# Patient Record
Sex: FEMALE | Race: WHITE | NOT HISPANIC OR LATINO | Employment: UNEMPLOYED | URBAN - METROPOLITAN AREA
[De-identification: names, ages, dates, MRNs, and addresses within clinical notes are randomized per-mention and may not be internally consistent; named-entity substitution may affect disease eponyms.]

---

## 2023-09-11 ENCOUNTER — HOSPITAL ENCOUNTER (EMERGENCY)
Facility: HOSPITAL | Age: 38
Discharge: HOME | End: 2023-09-11
Attending: EMERGENCY MEDICINE | Admitting: EMERGENCY MEDICINE
Payer: COMMERCIAL

## 2023-09-11 VITALS
RESPIRATION RATE: 18 BRPM | OXYGEN SATURATION: 100 % | SYSTOLIC BLOOD PRESSURE: 111 MMHG | HEIGHT: 66 IN | HEART RATE: 80 BPM | WEIGHT: 125 LBS | BODY MASS INDEX: 20.09 KG/M2 | TEMPERATURE: 98.6 F | DIASTOLIC BLOOD PRESSURE: 68 MMHG

## 2023-09-11 DIAGNOSIS — L02.415 CUTANEOUS ABSCESS OF RIGHT LOWER EXTREMITY: Primary | ICD-10-CM

## 2023-09-11 PROCEDURE — 87186 SC STD MICRODIL/AGAR DIL: CPT | Performed by: EMERGENCY MEDICINE

## 2023-09-11 PROCEDURE — 87147 CULTURE TYPE IMMUNOLOGIC: CPT | Performed by: EMERGENCY MEDICINE

## 2023-09-11 PROCEDURE — 87205 SMEAR GRAM STAIN: CPT | Performed by: EMERGENCY MEDICINE

## 2023-09-11 PROCEDURE — 99283 EMERGENCY DEPT VISIT LOW MDM: CPT

## 2023-09-11 PROCEDURE — 87070 CULTURE OTHR SPECIMN AEROBIC: CPT | Performed by: EMERGENCY MEDICINE

## 2023-09-11 RX ORDER — DOXYCYCLINE 100 MG/1
100 CAPSULE ORAL 2 TIMES DAILY
Qty: 14 CAPSULE | Refills: 0 | Status: SHIPPED | OUTPATIENT
Start: 2023-09-11

## 2023-09-11 RX ORDER — DOXYCYCLINE 100 MG/1
100 CAPSULE ORAL ONCE
Status: COMPLETED | OUTPATIENT
Start: 2023-09-11 | End: 2023-09-11

## 2023-09-11 RX ORDER — HYDROCODONE BITARTRATE AND ACETAMINOPHEN 7.5; 325 MG/1; MG/1
1 TABLET ORAL ONCE
Status: COMPLETED | OUTPATIENT
Start: 2023-09-11 | End: 2023-09-11

## 2023-09-11 RX ADMIN — DOXYCYCLINE 100 MG: 100 CAPSULE ORAL at 19:29

## 2023-09-11 RX ADMIN — HYDROCODONE BITARTRATE AND ACETAMINOPHEN 1 TABLET: 7.5; 325 TABLET ORAL at 18:30

## 2023-09-11 NOTE — DISCHARGE INSTRUCTIONS
Wound check with primary care in 2 to 3 days for recheck and culture results.  Continue doxycycline.  Warm compress, warm water rinses.  Elevate the extremity.  Packing removal in 2 days.  Return for increased pain, fever, persistent vomiting or any other concerns.

## 2023-09-11 NOTE — ED PROVIDER NOTES
"Subjective   History of Present Illness  38-year-old female noted to painful bump on the right side of her lower thigh over the last 2 to 3 days.  She denies fevers or chills.  States started a small pimple.  She reports no witnessed insect bites but was concerned about possible spider bite.  She reports no systemic symptoms.  Review of Systems    No past medical history on file.    Allergies   Allergen Reactions    Amoxicillin Unknown - High Severity    Penicillins Unknown - High Severity    Sulfa Antibiotics Unknown - High Severity       No past surgical history on file.    No family history on file.    Social History     Socioeconomic History    Marital status:        Prior to Admission medications    Medication Sig Start Date End Date Taking? Authorizing Provider   doxycycline (MONODOX) 100 MG capsule Take 1 capsule by mouth 2 (Two) Times a Day. 9/11/23   Nuno Alvarez MD     /68   Pulse 80   Temp 98.6 °F (37 °C)   Resp 18   Ht 167.6 cm (66\")   Wt 56.7 kg (125 lb)   SpO2 100%   BMI 20.18 kg/m²       Objective   Physical Exam  General: Well-appearing, no acute distress  Psych: Oriented, pleasant affect  Respirations: Clear, nonlabored respirations  Skin: Raised superficial area of erythema and fluctuance on the lateral aspect of the right lower thigh is about 2 cm in diameter, there is no surrounding erythema it is well localized to the raised lesion.  Is tender to palpation there is no deep structure involvement  Incision & Drainage    Date/Time: 9/11/2023 7:18 PM  Performed by: Nuno Alvarez MD  Authorized by: Nuno Alvarez MD     Consent:     Consent obtained:  Verbal    Consent given by:  Patient    Risks, benefits, and alternatives were discussed: yes      Risks discussed:  Bleeding, incomplete drainage, infection and pain    Alternatives discussed:  No treatment  Universal protocol:     Procedure explained and questions answered to patient or proxy's satisfaction: yes     "  Patient identity confirmed:  Verbally with patient  Location:     Type:  Abscess    Location:  Lower extremity    Lower extremity location:  Leg    Leg location:  R upper leg  Pre-procedure details:     Skin preparation:  Povidone-iodine  Sedation:     Sedation type:  None  Anesthesia:     Anesthesia method:  Local infiltration    Local anesthetic:  Lidocaine 1% WITH epi  Procedure type:     Complexity:  Complex  Procedure details:     Incision types:  Single straight    Incision depth:  Dermal    Wound management:  Probed and deloculated and irrigated with saline    Drainage:  Bloody    Drainage amount:  Scant    Wound treatment:  Drain placed    Packing materials:  1/4 in iodoform gauze  Post-procedure details:     Procedure completion:  Tolerated           ED Course  ED Course as of 09/11/23 1917   Mon Sep 11, 2023   1916 She reports additional allergy to clindamycin [SH]      ED Course User Index  [SH] Nuno Alvarez MD                                           Medical Decision Making  We discussed possibility of staph, MRSA, as well as spider bite including brown recluse bite.  Today's exam was consistent with a superficial cutaneous abscess and it was incised and drained.  She was advised of findings and discharged good condition she has multiple medical allergies and was prescribed doxycycline.  She was given Norco in emergency room for discomfort.  She is given warning signs for return and referred for primary care follow-up.    Problems Addressed:  Cutaneous abscess of right lower extremity: complicated acute illness or injury    Amount and/or Complexity of Data Reviewed  Labs: ordered.    Risk  Prescription drug management.        Final diagnoses:   Cutaneous abscess of right lower extremity       ED Disposition  ED Disposition       ED Disposition   Discharge    Condition   Stable    Comment   --               PATIENT CONNECTION - [facility] [address]  [phone]  Schedule an appointment  as soon as possible for a visit in 3 days           Medication List        New Prescriptions      doxycycline 100 MG capsule  Commonly known as: MONODOX  Take 1 capsule by mouth 2 (Two) Times a Day.               Where to Get Your Medications        These medications were sent to [facility]/pharmacy #[id number] - [place], [place] - [address] - [phone] [facility] [phone]   [address] [place] [address]      Phone: [phone]   doxycycline 100 MG capsule            Nuno Alvarez MD  09/11/23 1926

## 2023-09-13 ENCOUNTER — HOSPITAL ENCOUNTER (EMERGENCY)
Facility: HOSPITAL | Age: 38
Discharge: HOME | End: 2023-09-14
Attending: EMERGENCY MEDICINE
Payer: COMMERCIAL

## 2023-09-13 DIAGNOSIS — L02.415 CUTANEOUS ABSCESS OF RIGHT LOWER EXTREMITY: Primary | ICD-10-CM

## 2023-09-13 PROCEDURE — 99282 EMERGENCY DEPT VISIT SF MDM: CPT

## 2023-09-14 VITALS
SYSTOLIC BLOOD PRESSURE: 113 MMHG | BODY MASS INDEX: 21.89 KG/M2 | TEMPERATURE: 97.9 F | RESPIRATION RATE: 16 BRPM | HEART RATE: 91 BPM | OXYGEN SATURATION: 98 % | DIASTOLIC BLOOD PRESSURE: 62 MMHG | HEIGHT: 65 IN | WEIGHT: 131.39 LBS

## 2023-09-14 LAB
BACTERIA SPEC AEROBE CULT: ABNORMAL
GRAM STN SPEC: ABNORMAL
GRAM STN SPEC: ABNORMAL

## 2023-09-14 NOTE — DISCHARGE INSTRUCTIONS
Warm compresses, wash the area twice daily with some warm soapy water.  Continue doxycycline.  Return for increased pain, fever, vomiting or any other concerns

## 2023-09-14 NOTE — PROGRESS NOTES
Patient wound culture resulted with MRSA. Susceptible to Tetracyclines. Patient was given Rx for doxycycline. Therapy is appropriate coverage. No further follow-up required..    Microbiology Results (last 10 days)       Procedure Component Value - Date/Time    Wound Culture - Aspirate, Leg, Right [[id number]]  (Abnormal)  (Susceptibility) Collected: 09/11/23 1914    Lab Status: Final result Specimen: Aspirate from Leg, Right Updated: 09/14/23 0624     Wound Culture Light growth (2+) Staphylococcus aureus, MRSA     Comment:   Methicillin resistant Staphylococcus aureus, Patient may be an isolation risk.        Gram Stain Moderate (3+) WBCs seen      No organisms seen    Susceptibility        Staphylococcus aureus, MRSA      TAJ      Clindamycin Resistant      Erythromycin Resistant      Inducible Clindamycin Resistance Negative      Oxacillin Resistant      Rifampin Susceptible      Tetracycline Susceptible      Trimethoprim + Sulfamethoxazole Resistant      Vancomycin Susceptible                                   Grace Huang, Sammie  9/14/2023 11:22 EDT

## 2023-09-14 NOTE — ED PROVIDER NOTES
"Subjective   History of Present Illness  38-year-old female returns today for wound check.  States she is not yet started the doxycycline that was prescribed a couple of days ago following incision and drainage of a cutaneous abscess on her right thigh.  She reports no fevers chills or vomiting.  States she has now obtained some money to  the antibiotic in the morning.  She states the packing came out after about a day and a half following the incision and drainage.  She otherwise reports no active drainage or any other complaints.  Review of Systems    No past medical history on file.    Allergies   Allergen Reactions    Amoxicillin Unknown - High Severity    Penicillins Unknown - High Severity    Sulfa Antibiotics Unknown - High Severity       No past surgical history on file.    No family history on file.    Social History     Socioeconomic History    Marital status:        Prior to Admission medications    Medication Sig Start Date End Date Taking? Authorizing Provider   doxycycline (MONODOX) 100 MG capsule Take 1 capsule by mouth 2 (Two) Times a Day. 9/11/23   Nuno Alvarez MD     /70   Pulse 92   Temp 97.3 °F (36.3 °C) (Oral)   Resp 18   Ht 165.1 cm (65\")   Wt 59.6 kg (131 lb 6.3 oz)   SpO2 98%   BMI 21.87 kg/m²       Objective   Physical Exam  General: Well-appearing, no acute distress  Psych: Oriented, pleasant affect  Respirations: Clear, nonlabored respirations  Skin: Right thigh on the lateral aspect the area appears to be healing there is no significant surrounding erythema or fluctuance or drainage.  There is a raised cutaneous abscess has been incised and drained, no surrounding tissue involvement  Procedures           ED Course                                           Medical Decision Making  Patient advised the findings and advised maintain supportive care measures.  She is encouraged to start the doxycycline.  She was given warning signs for return and discharged in " good condition.    Problems Addressed:  Cutaneous abscess of right lower extremity: acute illness or injury    Amount and/or Complexity of Data Reviewed  External Data Reviewed: labs.     Details: Reviewed the culture result which is positive for MRSA, sensitivities pending        Final diagnoses:   Cutaneous abscess of right lower extremity       ED Disposition  ED Disposition       ED Disposition   Discharge    Condition   Stable    Comment   --               PATIENT CONNECTION - [facility] [address]  [phone]  Schedule an appointment as soon as possible for a visit in 1 week           Medication List      No changes were made to your prescriptions during this visit.            Nuno Alvarez MD  09/13/23 2794

## 2023-11-04 ENCOUNTER — ANESTHESIA EVENT (OUTPATIENT)
Dept: PERIOP | Facility: HOSPITAL | Age: 38
End: 2023-11-04
Payer: COMMERCIAL

## 2023-11-04 ENCOUNTER — HOSPITAL ENCOUNTER (OUTPATIENT)
Facility: HOSPITAL | Age: 38
Discharge: HOME | End: 2023-11-06
Attending: EMERGENCY MEDICINE | Admitting: SURGERY
Payer: COMMERCIAL

## 2023-11-04 ENCOUNTER — APPOINTMENT (OUTPATIENT)
Dept: GENERAL RADIOLOGY | Facility: HOSPITAL | Age: 38
End: 2023-11-04
Payer: COMMERCIAL

## 2023-11-04 ENCOUNTER — APPOINTMENT (OUTPATIENT)
Dept: ULTRASOUND IMAGING | Facility: HOSPITAL | Age: 38
End: 2023-11-04
Payer: COMMERCIAL

## 2023-11-04 ENCOUNTER — ANESTHESIA (OUTPATIENT)
Dept: PERIOP | Facility: HOSPITAL | Age: 38
End: 2023-11-04
Payer: COMMERCIAL

## 2023-11-04 DIAGNOSIS — D72.829 LEUKOCYTOSIS, UNSPECIFIED TYPE: ICD-10-CM

## 2023-11-04 DIAGNOSIS — L02.91 ABSCESS: ICD-10-CM

## 2023-11-04 DIAGNOSIS — M79.602 LEFT ARM PAIN: ICD-10-CM

## 2023-11-04 DIAGNOSIS — L02.414 ABSCESS OF LEFT UPPER EXTREMITY: Primary | ICD-10-CM

## 2023-11-04 DIAGNOSIS — F19.10 IV DRUG ABUSE: ICD-10-CM

## 2023-11-04 PROBLEM — F15.10 METHAMPHETAMINE ABUSE: Status: ACTIVE | Noted: 2023-11-04

## 2023-11-04 PROBLEM — L03.114 CELLULITIS OF LEFT UPPER EXTREMITY: Status: ACTIVE | Noted: 2023-11-04

## 2023-11-04 PROBLEM — L02.419 ABSCESS OF ANTECUBITAL FOSSA: Status: ACTIVE | Noted: 2023-11-04

## 2023-11-04 PROBLEM — L03.114 CELLULITIS OF LEFT UPPER ARM: Status: ACTIVE | Noted: 2023-11-04

## 2023-11-04 PROBLEM — A49.02 MRSA (METHICILLIN RESISTANT STAPHYLOCOCCUS AUREUS): Status: ACTIVE | Noted: 2023-11-04

## 2023-11-04 LAB
AMPHET+METHAMPHET UR QL: POSITIVE
ANION GAP SERPL CALCULATED.3IONS-SCNC: 8 MMOL/L (ref 5–15)
BARBITURATES UR QL SCN: NEGATIVE
BASOPHILS # BLD AUTO: 0.05 10*3/MM3 (ref 0–0.2)
BASOPHILS NFR BLD AUTO: 0.3 % (ref 0–1.5)
BENZODIAZ UR QL SCN: NEGATIVE
BUN SERPL-MCNC: 17 MG/DL (ref 6–20)
BUN/CREAT SERPL: 21 (ref 7–25)
CALCIUM SPEC-SCNC: 8.8 MG/DL (ref 8.6–10.5)
CANNABINOIDS SERPL QL: NEGATIVE
CHLORIDE SERPL-SCNC: 102 MMOL/L (ref 98–107)
CK SERPL-CCNC: 255 U/L (ref 20–180)
CO2 SERPL-SCNC: 25 MMOL/L (ref 22–29)
COCAINE UR QL: NEGATIVE
CREAT SERPL-MCNC: 0.81 MG/DL (ref 0.57–1)
D-LACTATE SERPL-SCNC: 1 MMOL/L (ref 0.5–2)
DEPRECATED RDW RBC AUTO: 40.3 FL (ref 37–54)
EGFRCR SERPLBLD CKD-EPI 2021: 96 ML/MIN/1.73
EOSINOPHIL # BLD AUTO: 0.2 10*3/MM3 (ref 0–0.4)
EOSINOPHIL NFR BLD AUTO: 1.2 % (ref 0.3–6.2)
ERYTHROCYTE [DISTWIDTH] IN BLOOD BY AUTOMATED COUNT: 12.4 % (ref 12.3–15.4)
ETHANOL BLD-MCNC: <10 MG/DL (ref 0–10)
ETHANOL UR QL: <0.01 %
FENTANYL UR-MCNC: NEGATIVE NG/ML
GLUCOSE BLDC GLUCOMTR-MCNC: 132 MG/DL (ref 70–130)
GLUCOSE SERPL-MCNC: 117 MG/DL (ref 65–99)
HCG SERPL QL: NEGATIVE
HCT VFR BLD AUTO: 39 % (ref 34–46.6)
HGB BLD-MCNC: 13 G/DL (ref 12–15.9)
IMM GRANULOCYTES # BLD AUTO: 0.1 10*3/MM3 (ref 0–0.05)
IMM GRANULOCYTES NFR BLD AUTO: 0.6 % (ref 0–0.5)
LYMPHOCYTES # BLD AUTO: 1.95 10*3/MM3 (ref 0.7–3.1)
LYMPHOCYTES NFR BLD AUTO: 11.4 % (ref 19.6–45.3)
MCH RBC QN AUTO: 29.5 PG (ref 26.6–33)
MCHC RBC AUTO-ENTMCNC: 33.3 G/DL (ref 31.5–35.7)
MCV RBC AUTO: 88.6 FL (ref 79–97)
METHADONE UR QL SCN: NEGATIVE
MONOCYTES # BLD AUTO: 0.92 10*3/MM3 (ref 0.1–0.9)
MONOCYTES NFR BLD AUTO: 5.4 % (ref 5–12)
NEUTROPHILS NFR BLD AUTO: 13.84 10*3/MM3 (ref 1.7–7)
NEUTROPHILS NFR BLD AUTO: 81.1 % (ref 42.7–76)
NRBC BLD AUTO-RTO: 0 /100 WBC (ref 0–0.2)
OPIATES UR QL: NEGATIVE
OXYCODONE UR QL SCN: NEGATIVE
PLATELET # BLD AUTO: 275 10*3/MM3 (ref 140–450)
PMV BLD AUTO: 10.7 FL (ref 6–12)
POTASSIUM SERPL-SCNC: 3.7 MMOL/L (ref 3.5–5.2)
PROCALCITONIN SERPL-MCNC: 0.06 NG/ML (ref 0–0.25)
RBC # BLD AUTO: 4.4 10*6/MM3 (ref 3.77–5.28)
SODIUM SERPL-SCNC: 135 MMOL/L (ref 136–145)
WBC NRBC COR # BLD: 17.06 10*3/MM3 (ref 3.4–10.8)

## 2023-11-04 PROCEDURE — 25810000003 LACTATED RINGERS PER 1000 ML: Performed by: ANESTHESIOLOGY

## 2023-11-04 PROCEDURE — 85025 COMPLETE CBC W/AUTO DIFF WBC: CPT | Performed by: PHYSICIAN ASSISTANT

## 2023-11-04 PROCEDURE — G0378 HOSPITAL OBSERVATION PER HR: HCPCS

## 2023-11-04 PROCEDURE — 87040 BLOOD CULTURE FOR BACTERIA: CPT | Performed by: NURSE PRACTITIONER

## 2023-11-04 PROCEDURE — 80307 DRUG TEST PRSMV CHEM ANLYZR: CPT | Performed by: PHYSICIAN ASSISTANT

## 2023-11-04 PROCEDURE — 84145 PROCALCITONIN (PCT): CPT | Performed by: NURSE PRACTITIONER

## 2023-11-04 PROCEDURE — 25010000002 FENTANYL CITRATE (PF) 50 MCG/ML SOLUTION: Performed by: ANESTHESIOLOGY

## 2023-11-04 PROCEDURE — 99203 OFFICE O/P NEW LOW 30 MIN: CPT | Performed by: SURGERY

## 2023-11-04 PROCEDURE — 87075 CULTR BACTERIA EXCEPT BLOOD: CPT | Performed by: SURGERY

## 2023-11-04 PROCEDURE — 76882 US LMTD JT/FCL EVL NVASC XTR: CPT

## 2023-11-04 PROCEDURE — 82550 ASSAY OF CK (CPK): CPT | Performed by: PHYSICIAN ASSISTANT

## 2023-11-04 PROCEDURE — 36415 COLL VENOUS BLD VENIPUNCTURE: CPT

## 2023-11-04 PROCEDURE — 96365 THER/PROPH/DIAG IV INF INIT: CPT

## 2023-11-04 PROCEDURE — 25010000002 MIDAZOLAM PER 1 MG: Performed by: ANESTHESIOLOGY

## 2023-11-04 PROCEDURE — 99284 EMERGENCY DEPT VISIT MOD MDM: CPT

## 2023-11-04 PROCEDURE — 25010000002 VANCOMYCIN 10 G RECONSTITUTED SOLUTION: Performed by: NURSE PRACTITIONER

## 2023-11-04 PROCEDURE — 25810000003 SODIUM CHLORIDE 0.9 % SOLUTION: Performed by: NURSE PRACTITIONER

## 2023-11-04 PROCEDURE — 10060 I&D ABSCESS SIMPLE/SINGLE: CPT | Performed by: SURGERY

## 2023-11-04 PROCEDURE — 82948 REAGENT STRIP/BLOOD GLUCOSE: CPT

## 2023-11-04 PROCEDURE — 25010000002 DEXAMETHASONE PER 1 MG: Performed by: ANESTHESIOLOGY

## 2023-11-04 PROCEDURE — 25010000002 ONDANSETRON PER 1 MG: Performed by: ANESTHESIOLOGY

## 2023-11-04 PROCEDURE — 80048 BASIC METABOLIC PNL TOTAL CA: CPT | Performed by: PHYSICIAN ASSISTANT

## 2023-11-04 PROCEDURE — 87070 CULTURE OTHR SPECIMN AEROBIC: CPT | Performed by: SURGERY

## 2023-11-04 PROCEDURE — 25010000002 PROPOFOL 500 MG/50ML EMULSION: Performed by: ANESTHESIOLOGY

## 2023-11-04 PROCEDURE — 25010000002 ROPIVACAINE PER 1 MG: Performed by: ANESTHESIOLOGY

## 2023-11-04 PROCEDURE — 84703 CHORIONIC GONADOTROPIN ASSAY: CPT | Performed by: PHYSICIAN ASSISTANT

## 2023-11-04 PROCEDURE — 82077 ASSAY SPEC XCP UR&BREATH IA: CPT | Performed by: PHYSICIAN ASSISTANT

## 2023-11-04 PROCEDURE — 25010000002 METHADONE PER 10 MG: Performed by: ANESTHESIOLOGY

## 2023-11-04 PROCEDURE — 83605 ASSAY OF LACTIC ACID: CPT | Performed by: NURSE PRACTITIONER

## 2023-11-04 PROCEDURE — 87205 SMEAR GRAM STAIN: CPT | Performed by: SURGERY

## 2023-11-04 PROCEDURE — 73070 X-RAY EXAM OF ELBOW: CPT

## 2023-11-04 RX ORDER — NALOXONE HCL 0.4 MG/ML
0.2 VIAL (ML) INJECTION AS NEEDED
Status: DISCONTINUED | OUTPATIENT
Start: 2023-11-04 | End: 2023-11-05

## 2023-11-04 RX ORDER — ACETAMINOPHEN 650 MG/1
650 SUPPOSITORY RECTAL EVERY 4 HOURS PRN
Status: DISCONTINUED | OUTPATIENT
Start: 2023-11-04 | End: 2023-11-06 | Stop reason: HOSPADM

## 2023-11-04 RX ORDER — ONDANSETRON 4 MG/1
4 TABLET, FILM COATED ORAL EVERY 6 HOURS PRN
Status: DISCONTINUED | OUTPATIENT
Start: 2023-11-04 | End: 2023-11-06 | Stop reason: HOSPADM

## 2023-11-04 RX ORDER — DROPERIDOL 2.5 MG/ML
0.62 INJECTION, SOLUTION INTRAMUSCULAR; INTRAVENOUS
Status: DISCONTINUED | OUTPATIENT
Start: 2023-11-04 | End: 2023-11-05

## 2023-11-04 RX ORDER — MIDAZOLAM HYDROCHLORIDE 1 MG/ML
INJECTION INTRAMUSCULAR; INTRAVENOUS
Status: COMPLETED | OUTPATIENT
Start: 2023-11-04 | End: 2023-11-04

## 2023-11-04 RX ORDER — SODIUM CHLORIDE, SODIUM LACTATE, POTASSIUM CHLORIDE, CALCIUM CHLORIDE 600; 310; 30; 20 MG/100ML; MG/100ML; MG/100ML; MG/100ML
9 INJECTION, SOLUTION INTRAVENOUS CONTINUOUS
Status: DISCONTINUED | OUTPATIENT
Start: 2023-11-04 | End: 2023-11-04

## 2023-11-04 RX ORDER — IPRATROPIUM BROMIDE AND ALBUTEROL SULFATE 2.5; .5 MG/3ML; MG/3ML
3 SOLUTION RESPIRATORY (INHALATION) ONCE AS NEEDED
Status: DISCONTINUED | OUTPATIENT
Start: 2023-11-04 | End: 2023-11-05 | Stop reason: SURG

## 2023-11-04 RX ORDER — SODIUM CHLORIDE 0.9 % (FLUSH) 0.9 %
10 SYRINGE (ML) INJECTION EVERY 12 HOURS SCHEDULED
Status: DISCONTINUED | OUTPATIENT
Start: 2023-11-04 | End: 2023-11-06 | Stop reason: HOSPADM

## 2023-11-04 RX ORDER — ACETAMINOPHEN 325 MG/1
650 TABLET ORAL EVERY 4 HOURS PRN
Status: DISCONTINUED | OUTPATIENT
Start: 2023-11-04 | End: 2023-11-06 | Stop reason: HOSPADM

## 2023-11-04 RX ORDER — HYDROMORPHONE HYDROCHLORIDE 1 MG/ML
0.5 INJECTION, SOLUTION INTRAMUSCULAR; INTRAVENOUS; SUBCUTANEOUS
Status: DISCONTINUED | OUTPATIENT
Start: 2023-11-04 | End: 2023-11-05 | Stop reason: SURG

## 2023-11-04 RX ORDER — VANCOMYCIN HYDROCHLORIDE 1 G/200ML
1000 INJECTION, SOLUTION INTRAVENOUS EVERY 12 HOURS
Status: DISCONTINUED | OUTPATIENT
Start: 2023-11-04 | End: 2023-11-06 | Stop reason: HOSPADM

## 2023-11-04 RX ORDER — HYDROCODONE BITARTRATE AND ACETAMINOPHEN 5; 325 MG/1; MG/1
1 TABLET ORAL ONCE AS NEEDED
Status: DISCONTINUED | OUTPATIENT
Start: 2023-11-04 | End: 2023-11-05 | Stop reason: SURG

## 2023-11-04 RX ORDER — HYDROCODONE BITARTRATE AND ACETAMINOPHEN 5; 325 MG/1; MG/1
1 TABLET ORAL EVERY 6 HOURS PRN
Status: DISCONTINUED | OUTPATIENT
Start: 2023-11-04 | End: 2023-11-06

## 2023-11-04 RX ORDER — LABETALOL HYDROCHLORIDE 5 MG/ML
5 INJECTION, SOLUTION INTRAVENOUS
Status: DISCONTINUED | OUTPATIENT
Start: 2023-11-04 | End: 2023-11-05 | Stop reason: SURG

## 2023-11-04 RX ORDER — HYDRALAZINE HYDROCHLORIDE 20 MG/ML
5 INJECTION INTRAMUSCULAR; INTRAVENOUS
Status: DISCONTINUED | OUTPATIENT
Start: 2023-11-04 | End: 2023-11-05 | Stop reason: SURG

## 2023-11-04 RX ORDER — SODIUM CHLORIDE 0.9 % (FLUSH) 0.9 %
3-10 SYRINGE (ML) INJECTION AS NEEDED
Status: DISCONTINUED | OUTPATIENT
Start: 2023-11-04 | End: 2023-11-04 | Stop reason: HOSPADM

## 2023-11-04 RX ORDER — PROMETHAZINE HYDROCHLORIDE 25 MG/1
25 TABLET ORAL ONCE AS NEEDED
Status: DISCONTINUED | OUTPATIENT
Start: 2023-11-04 | End: 2023-11-05

## 2023-11-04 RX ORDER — ACETAMINOPHEN 160 MG/5ML
650 SOLUTION ORAL EVERY 4 HOURS PRN
Status: DISCONTINUED | OUTPATIENT
Start: 2023-11-04 | End: 2023-11-06 | Stop reason: HOSPADM

## 2023-11-04 RX ORDER — ONDANSETRON 2 MG/ML
INJECTION INTRAMUSCULAR; INTRAVENOUS AS NEEDED
Status: DISCONTINUED | OUTPATIENT
Start: 2023-11-04 | End: 2023-11-04 | Stop reason: SURG

## 2023-11-04 RX ORDER — OXYCODONE AND ACETAMINOPHEN 7.5; 325 MG/1; MG/1
1 TABLET ORAL EVERY 4 HOURS PRN
Status: DISCONTINUED | OUTPATIENT
Start: 2023-11-04 | End: 2023-11-06

## 2023-11-04 RX ORDER — PROPOFOL 10 MG/ML
INJECTION, EMULSION INTRAVENOUS AS NEEDED
Status: DISCONTINUED | OUTPATIENT
Start: 2023-11-04 | End: 2023-11-04 | Stop reason: SURG

## 2023-11-04 RX ORDER — MIDAZOLAM HYDROCHLORIDE 1 MG/ML
1 INJECTION INTRAMUSCULAR; INTRAVENOUS
Status: DISCONTINUED | OUTPATIENT
Start: 2023-11-04 | End: 2023-11-04 | Stop reason: HOSPADM

## 2023-11-04 RX ORDER — FENTANYL CITRATE 50 UG/ML
50 INJECTION, SOLUTION INTRAMUSCULAR; INTRAVENOUS ONCE AS NEEDED
Status: COMPLETED | OUTPATIENT
Start: 2023-11-04 | End: 2023-11-04

## 2023-11-04 RX ORDER — LIDOCAINE HYDROCHLORIDE 10 MG/ML
0.5 INJECTION, SOLUTION INFILTRATION; PERINEURAL ONCE AS NEEDED
Status: DISCONTINUED | OUTPATIENT
Start: 2023-11-04 | End: 2023-11-04 | Stop reason: HOSPADM

## 2023-11-04 RX ORDER — ROPIVACAINE HYDROCHLORIDE 5 MG/ML
INJECTION, SOLUTION EPIDURAL; INFILTRATION; PERINEURAL
Status: COMPLETED | OUTPATIENT
Start: 2023-11-04 | End: 2023-11-04

## 2023-11-04 RX ORDER — PROMETHAZINE HYDROCHLORIDE 25 MG/1
25 SUPPOSITORY RECTAL ONCE AS NEEDED
Status: DISCONTINUED | OUTPATIENT
Start: 2023-11-04 | End: 2023-11-05

## 2023-11-04 RX ORDER — SODIUM CHLORIDE 0.9 % (FLUSH) 0.9 %
3 SYRINGE (ML) INJECTION EVERY 12 HOURS SCHEDULED
Status: DISCONTINUED | OUTPATIENT
Start: 2023-11-04 | End: 2023-11-04 | Stop reason: HOSPADM

## 2023-11-04 RX ORDER — ONDANSETRON 2 MG/ML
4 INJECTION INTRAMUSCULAR; INTRAVENOUS ONCE AS NEEDED
Status: DISCONTINUED | OUTPATIENT
Start: 2023-11-04 | End: 2023-11-05 | Stop reason: SURG

## 2023-11-04 RX ORDER — FLUMAZENIL 0.1 MG/ML
0.2 INJECTION INTRAVENOUS AS NEEDED
Status: DISCONTINUED | OUTPATIENT
Start: 2023-11-04 | End: 2023-11-05 | Stop reason: SURG

## 2023-11-04 RX ORDER — METHADONE HYDROCHLORIDE 10 MG/ML
10 INJECTION, SOLUTION INTRAMUSCULAR; INTRAVENOUS; SUBCUTANEOUS ONCE
Status: COMPLETED | OUTPATIENT
Start: 2023-11-04 | End: 2023-11-04

## 2023-11-04 RX ORDER — MIDAZOLAM HYDROCHLORIDE 1 MG/ML
1 INJECTION INTRAMUSCULAR; INTRAVENOUS
Status: COMPLETED | OUTPATIENT
Start: 2023-11-04 | End: 2023-11-04

## 2023-11-04 RX ORDER — FAMOTIDINE 10 MG/ML
20 INJECTION, SOLUTION INTRAVENOUS EVERY 12 HOURS SCHEDULED
Status: DISCONTINUED | OUTPATIENT
Start: 2023-11-04 | End: 2023-11-06 | Stop reason: HOSPADM

## 2023-11-04 RX ORDER — SODIUM CHLORIDE 0.9 % (FLUSH) 0.9 %
10 SYRINGE (ML) INJECTION AS NEEDED
Status: DISCONTINUED | OUTPATIENT
Start: 2023-11-04 | End: 2023-11-06 | Stop reason: HOSPADM

## 2023-11-04 RX ORDER — ONDANSETRON 2 MG/ML
4 INJECTION INTRAMUSCULAR; INTRAVENOUS EVERY 6 HOURS PRN
Status: DISCONTINUED | OUTPATIENT
Start: 2023-11-04 | End: 2023-11-04

## 2023-11-04 RX ORDER — SODIUM CHLORIDE 9 MG/ML
40 INJECTION, SOLUTION INTRAVENOUS AS NEEDED
Status: DISCONTINUED | OUTPATIENT
Start: 2023-11-04 | End: 2023-11-06 | Stop reason: HOSPADM

## 2023-11-04 RX ORDER — FENTANYL CITRATE 50 UG/ML
50 INJECTION, SOLUTION INTRAMUSCULAR; INTRAVENOUS
Status: DISCONTINUED | OUTPATIENT
Start: 2023-11-04 | End: 2023-11-05 | Stop reason: SURG

## 2023-11-04 RX ORDER — EPHEDRINE SULFATE 50 MG/ML
5 INJECTION, SOLUTION INTRAVENOUS ONCE AS NEEDED
Status: DISCONTINUED | OUTPATIENT
Start: 2023-11-04 | End: 2023-11-05

## 2023-11-04 RX ORDER — DEXAMETHASONE SODIUM PHOSPHATE 4 MG/ML
INJECTION, SOLUTION INTRA-ARTICULAR; INTRALESIONAL; INTRAMUSCULAR; INTRAVENOUS; SOFT TISSUE
Status: COMPLETED | OUTPATIENT
Start: 2023-11-04 | End: 2023-11-04

## 2023-11-04 RX ORDER — DIPHENHYDRAMINE HYDROCHLORIDE 50 MG/ML
12.5 INJECTION INTRAMUSCULAR; INTRAVENOUS
Status: DISCONTINUED | OUTPATIENT
Start: 2023-11-04 | End: 2023-11-05

## 2023-11-04 RX ADMIN — FAMOTIDINE 20 MG: 10 INJECTION INTRAVENOUS at 14:11

## 2023-11-04 RX ADMIN — ONDANSETRON 4 MG: 2 INJECTION INTRAMUSCULAR; INTRAVENOUS at 18:48

## 2023-11-04 RX ADMIN — MIDAZOLAM 1 MG: 1 INJECTION INTRAMUSCULAR; INTRAVENOUS at 17:52

## 2023-11-04 RX ADMIN — DEXAMETHASONE SODIUM PHOSPHATE 4 MG: 4 INJECTION, SOLUTION INTRA-ARTICULAR; INTRALESIONAL; INTRAMUSCULAR; INTRAVENOUS; SOFT TISSUE at 15:10

## 2023-11-04 RX ADMIN — VANCOMYCIN HYDROCHLORIDE 1250 MG: 10 INJECTION, POWDER, LYOPHILIZED, FOR SOLUTION INTRAVENOUS at 08:33

## 2023-11-04 RX ADMIN — HYDROCODONE BITARTRATE AND ACETAMINOPHEN 1 TABLET: 5; 325 TABLET ORAL at 12:35

## 2023-11-04 RX ADMIN — FENTANYL CITRATE 50 MCG: 50 INJECTION, SOLUTION INTRAMUSCULAR; INTRAVENOUS at 18:30

## 2023-11-04 RX ADMIN — ROPIVACAINE HYDROCHLORIDE 20 ML: 5 INJECTION EPIDURAL; INFILTRATION; PERINEURAL at 15:10

## 2023-11-04 RX ADMIN — PROPOFOL 150 MG: 10 INJECTION, EMULSION INTRAVENOUS at 18:17

## 2023-11-04 RX ADMIN — MIDAZOLAM 2 MG: 1 INJECTION INTRAMUSCULAR; INTRAVENOUS at 14:55

## 2023-11-04 RX ADMIN — MIDAZOLAM 1 MG: 1 INJECTION INTRAMUSCULAR; INTRAVENOUS at 17:59

## 2023-11-04 RX ADMIN — SODIUM CHLORIDE 1000 ML: 9 INJECTION, SOLUTION INTRAVENOUS at 06:43

## 2023-11-04 RX ADMIN — SODIUM CHLORIDE, POTASSIUM CHLORIDE, SODIUM LACTATE AND CALCIUM CHLORIDE 9 ML/HR: 600; 310; 30; 20 INJECTION, SOLUTION INTRAVENOUS at 14:02

## 2023-11-04 RX ADMIN — METHADONE HYDROCHLORIDE 10 MG: 10 INJECTION, SOLUTION INTRAMUSCULAR; INTRAVENOUS; SUBCUTANEOUS at 14:26

## 2023-11-04 NOTE — H&P
HISTORY AND PHYSICAL   [facility]        Date of Admission: 2023  Patient Identification:  Name: Lay Rsetrepo  Age: 38 y.o.  Sex: female  :  1985  MRN: [id number]                     Primary Care Physician: Provider, No Known    Chief Complaint: Left arm discomfort    History of Present Illness:   Ms. Restrepo is a unique 38-year-old female who ultimately is pretty forthcoming with her drug abuse.  Her drug of choice is methamphetamine and she is positive for that on her urine drug screen.  She admits to injecting this in she generally utilizes her left upper extremity as is evident by cellulitis with abscess in her left antecubital fossa.  She is brought here by EMS that she was found down in a parking lot with no socks or shoes.  Surgical consult was pending and A was asked to admit.  Patient denies any fever chills night sweats.  Denies any chest pain or troubles breathing.    Past Medical History:  No past medical history on file.  Past Surgical History:  No past surgical history on file.   Home Meds:  (Not in a hospital admission)      Allergies:  Allergies   Allergen Reactions    Amoxicillin Unknown - High Severity    Penicillins Unknown - High Severity    Sulfa Antibiotics Unknown - High Severity     Immunizations:    There is no immunization history on file for this patient.  Social History:   Social History     Social History Narrative    Not on file     Social History     Socioeconomic History    Marital status:        Family History:  No family history on file.     Review of Systems  See history of present illness and past medical history.  Unreliable but denies any fever chills night sweats nausea vomiting problems swallowing chest pain palpitation cough shortness of breath abdominal pain dysuria remainder of ROS is negative.    Objective:  T Max 24 hrs: Temp (24hrs), Av.4 °F (36.3 °C), Min:97.4 °F (36.3 °C), Max:97.4 °F (36.3 °C)    Vitals Ranges:   Temp:   "[97.4 °F (36.3 °C)] 97.4 °F (36.3 °C)  Heart Rate:  [69-93] 83  Resp:  [16-22] 16  BP: (100-112)/(58-74) 101/63      Exam:  /63   Pulse 83   Temp 97.4 °F (36.3 °C) (Tympanic)   Resp 16   Ht 165.1 cm (65\")   Wt 59.4 kg (131 lb)   SpO2 95%   BMI 21.80 kg/m²     General Appearance:    Alert, cooperative, body habitus seems consistent with active drug use as well as affect but she is appreciative and relaxed and calm and overall oriented currently.  No family at bedside.  Chronically disheveled with poor hygiene   Head:    Normocephalic, without obvious abnormality, atraumatic   Eyes:    PERRL, conjunctivae/corneas clear, EOM's intact, both eyes   Ears:    Normal external ear canals, both ears   Nose:   Nares normal, septum midline, mucosa normal, no drainage    or sinus tenderness   Throat: Poor dentition lips, mucosa, and tongue normal   Neck:   Supple, no meningismus JVD       Lungs:     Clear to auscultation bilaterally, respirations unlabored   Chest Wall:    No tenderness or deformity    Heart:    Regular rate and rhythm, S1 and S2 normal   Abdomen:     Soft, nontender, bowel sounds active all four quadrants   Extremities: Moving all, see below   Pulses:   2+ and symmetric all extremities   Skin: Abscess to left AC with induration extending beyond erythema as well as decreases in range of motion due to discomfort at her elbow joint       Neurologic:   CNII-XII intact      .    Data Review:  Labs in chart were reviewed.             Imaging Results (All)       Procedure Component Value Units Date/Time    US Nonvascular Extremity Limited [[id number]] Collected: 11/04/23 1025     Updated: 11/04/23 1031    Narrative:      US NONVASCULAR EXTREMITY LIMITED-     INDICATIONS: Possible subcutaneous abscess.     TECHNIQUE: LIMITED SUPERFICIAL extremity ultrasound.     COMPARISON: Correlated with x-ray from 11/4/2023     FINDINGS:     A heterogeneous hypoechoic region in the subcutaneous soft tissues in  the area " of soft tissue swelling, at the antecubital fossa could  represent phlegmonous change or potentially complex subcutaneous fluid  collection, with some increased vascularity/hyperemia apparent in this  region. This area was measured at 3.5 x 1.3 cm on image #8 of series 1.  Within this region, several tiny bright foci could represent soft tissue  gas/gas producing infection. The soft tissues appear diffusely  edematous. No other collections or lesions are identified. If further  imaging evaluation is indicated, enhanced cross-sectional imaging could  be obtained.       Impression:         As described.           This report was finalized on 11/4/2023 10:28 AM by Dr. Jeff Da Silva M.D on Workstation: [id number]       XR ELBOW 2 VIEW LEFT [[id number]] Collected: 11/04/23 0724     Updated: 11/04/23 0728    Narrative:      XR ELBOW 2 VW LEFT-     INDICATIONS: Redness and swelling        TECHNIQUE: 2 VIEWS OF THE LEFT ELBOW     COMPARISON: None available     FINDINGS:     Soft tissue swelling laterally at the elbow may be evidence of  inflammation, infection, injury, correlate clinically. No acute  fracture, erosion, dislocation, or elbow effusion. If there is further  clinical concern, MRI can be considered for further evaluation.       Impression:         As described.           This report was finalized on 11/4/2023 7:25 AM by Dr. Jeff Da Silva M.D on Workstation: [id number]                 Assessment:  Active Hospital Problems    Diagnosis  POA    Abscess of antecubital fossa [L02.419]  Unknown    Cellulitis of left upper arm [L03.114]  Unknown    MRSA (methicillin resistant Staphylococcus aureus) [A49.02]  Unknown    Methamphetamine abuse [F15.10]  Unknown      Resolved Hospital Problems   No resolved problems to display.       Plan:    Mild cellulitis left upper extremity with abscess of left AC.  This is secondary to the site which patient chooses to inject or methamphetamine.   -IV vancomycin   -General  surgery consulted for I&D     SCDs for prophylaxis    IV Pepcid for GI prophylaxis      Observation admission -anticipate discharge tomorrow pending surgical input   -High risk for this patient leaving AMA postoperatively    Carlos Gaffney MD  11/4/2023  11:13 EDT\

## 2023-11-04 NOTE — CONSULTS
General Surgery H&P/Consultation      Impression/Plan: 38-year-old lady with history of IV drug abuse presenting with left antecubital fossa abscess.  Ultrasound reviewed without Doppler flow within the abscess.  Recommend incision and drainage in the operating room.  Keep NPO.    CC: Found down in parking lot by good [employer] rolling around without socks or shoes    HPI:   Miss Lay Restrepo is a 38 y.o. female that presented to the hospital via EMS after a good [employer] reportedly flagged down EMS when she was found in a parking lot with no socks or shoes.  Reports last methamphetamine use was 3 days ago.  Approximately 2 days ago she began to have swelling and redness over her left arm.  Denies drainage from the area.    Past Medical History:   No past medical history on file.    Past Surgical History:   No past surgical history on file.    Medications:  (Not in a hospital admission)      Allergies:   Allergies   Allergen Reactions    Amoxicillin Unknown - High Severity    Penicillins Unknown - High Severity    Sulfa Antibiotics Unknown - High Severity       Social History:   Social History     Socioeconomic History    Marital status:        Family History:   No family history on file.    Review of Systems:  Review of systems positive as stated in HPI    Physical Exam:   Vitals:    11/04/23 0931   BP: 101/63   Pulse: 83   Resp: 16   Temp:    SpO2: 95%     BMI: Body mass index is 21.8 kg/m².   GENERAL: no acute distress, awake and alert  Skin: 3 cm area of fluctuance and erythema over left forearm/antecubital fossa, range of motion of the elbow limited by pain        Pertinent labs:   Results from last 7 days   Lab Units 11/04/23  0550   WBC 10*3/mm3 17.06*   HEMOGLOBIN g/dL 13.0   HEMATOCRIT % 39.0   PLATELETS 10*3/mm3 275     Results from last 7 days   Lab Units 11/04/23  0550   SODIUM mmol/L 135*   POTASSIUM mmol/L 3.7   CHLORIDE mmol/L 102   CO2 mmol/L 25.0   BUN mg/dL 17   CREATININE mg/dL 0.81    CALCIUM mg/dL 8.8   GLUCOSE mg/dL 117*       IMAGING:  Ultrasound reviewed showing 3 x 1 cm fluid collection consistent with abscess, no internal Doppler flow evident          Javed Lim MD  General and Endoscopic Surgery  [facility]    [address], [address]  [place] [id number]  P: [phone]  F: [phone]

## 2023-11-04 NOTE — ED NOTES
Pt arrived via [facility] ems from [facility] after ems was flagged down by a good Confucianist when they found her rolling around in a parking lot w/ no socks or shoes.    Per pt she used meth x3 days ago and states she may be pregnant. Pts CC @ this time is L upper arm/ shoulder pain x3 years. Per ems pt does have a visible abscess to her mid-lower L arm.

## 2023-11-04 NOTE — ED NOTES
Attempted to call report, nurse not available told them to call back with questions, putting pt in for transport

## 2023-11-04 NOTE — OP NOTE
OPERATIVE REPORT     DATE OF OPERATION: 11/04/23    SURGEON: Javed Lim MD    PREOPERATIVE DIAGNOSIS: Left forearm abscess    POSTOPERATIVE DIAGNOSIS: Same    PROCEDURE PERFORMED: Incision and drainage of 3 cm left forearm abscess with placement of packing    ANESTHESIA: General    SPECIMEN: Wound culture    DRAINS: Half-inch iodoform gauze    BLOOD LOSS: Minimal    INDICATIONS FOR OPERATION: Miss Lay Restrepo is a 38 y.o.   lady with an abscess at the site of injection.  I recommended proceeding to the operating room for incision and drainage. All risks (including bleeding, infection, damage to surrounding structures), benefits, and alternatives were explained to the patient and she agreed and wished to proceed.  Informed consent was obtained.    OPERATIVE REPORT: The patient was taken to the operating room, transferred onto the operating room table, and underwent general anesthesia with LMA without incident. The patient was prepped and draped in the usual sterile fashion.  She received vancomycin on admission, and a timeout was performed.      Over the area of maximum fluctuance on the left forearm near the antecubital fossa but not within the antecubital fossa I made an incision with a 15 blade scalpel.  5 cc of purulent material was expressed and cultured.  The cavity had loculations which were broken up with a hemostat.  The wound was irrigated and packed with half-inch iodoform gauze and covered with dry gauze and tape.            Javed Lim M.D.  General and Endoscopic Surgery  [facility]    [address] [place] [address], [address]  [place], [place], [address]  P: [phone]  F: [phone]

## 2023-11-04 NOTE — ED PROVIDER NOTES
MD ATTESTATION NOTE    The CÉSAR and I have discussed this patient's history, physical exam, and treatment plan.  I have reviewed the documentation and personally had a face to face interaction with the patient. I affirm the documentation and agree with the treatment and plan.  The attached note describes my personal findings.      I provided a substantive portion of the care of the patient.  I personally performed the physical exam in its entirety, and below are my findings.      Brief HPI: 38-year-old female who was brought to the ER via EMS for altered mental status.  The patient was found by bystander rolling around in a parking lot without shoes on and EMS was called.  The patient is somewhat confused and agitated with only complaint of left arm pain    General : 38-year-old female who is sleepy but when aroused becomes irritated patient is oriented x2  HEENT: NCAT  CV: Heart is regular with no murmurs  Respiratory: CTA bilaterally  Abd: Soft and nontender  Ext: In the lateral aspect of the left antecubital fossa the patient has a red swollen tender area but I feel no ledy fluctuance or pulsatile Tatian.  Skin: Minimal erythema around swelling in left antecubital fossa  Neuro: Awake with a nonfocal neuro exam  Psych: Normal mood and affect      Plan: We will check labs, urinalysis and x-ray and treat with IV antibiotics and then reevaluate.    The patient's white count is 17 but she has a normal lactic acid and procalcitonin.  Her EtOH is negative and her UDS is positive for meth.  We have treated with IV antibiotics but have concerns about the cellulitis/abscess on her right antecubital fossa.  We consulted the hospitalist to ask us to call Ortho.  Ortho asked us to call surgery.  We have discussed the case with Dr. Lim from surgery.  He has asked that we order an ultrasound and he will consult.  We have discussed the case with Dr. Gaffney from [facility] who will admit the patient.  We have given the patient  vancomycin so far.  The patient states that she is allergic to penicillin and she is unsure if she has had cephalosporins before.  At this point we will give the patient vancomycin, order an ultrasound of her abscess and admit to the hospital for surgical consultation.         Dax Tang MD  11/04/23 1131

## 2023-11-04 NOTE — PERIOPERATIVE NURSING NOTE
Pt 3 days post meth use, unable to hold still for any length of time, cooperative and able to answer questions. Is a little hard to understand due to no teeth being present in the mouth. Experiencing hot/ cold flashes and obvious signs of detox- will notify provider of need to address this issue.   Methadone ordered and administered IV pre PNV block

## 2023-11-04 NOTE — ANESTHESIA PREPROCEDURE EVALUATION
Anesthesia Evaluation     Patient summary reviewed and Nursing notes reviewed   NPO Solid Status: > 8 hours  NPO Liquid Status: > 2 hours           Airway   Mallampati: II  TM distance: >3 FB  Neck ROM: full  Dental - normal exam     Pulmonary - normal exam    breath sounds clear to auscultation  (+) a smoker Current Abstained day of surgery,  Cardiovascular - negative cardio ROS and normal exam    Rhythm: regular  Rate: normal    (-) angina, orthopnea, PND, ABDULLAHI      Neuro/Psych- negative ROS  GI/Hepatic/Renal/Endo - negative ROS     Musculoskeletal (-) negative ROS    Abdominal    Substance History   (+) drug use (Amphetamines, Fentanyl, Methylphenidate, Methamphetamines)     OB/GYN negative ob/gyn ROS         Other - negative ROS                         Anesthesia Plan    ASA 2     regional     ( Supraclavicular block)  intravenous induction     Anesthetic plan, risks, benefits, and alternatives have been provided, discussed and informed consent has been obtained with: patient.        CODE STATUS:

## 2023-11-04 NOTE — ED NOTES
Nursing report ED to floor  Lay Restrepo  38 y.o.  female    HPI :   Chief Complaint   Patient presents with    Altered Mental Status    Arm Pain       Admitting doctor:   No admitting provider for patient encounter.    Admitting diagnosis:   The primary encounter diagnosis was Abscess of left upper extremity. Diagnoses of Left arm pain, IV drug abuse, and Leukocytosis, unspecified type were also pertinent to this visit.    Code status:   Current Code Status       Date Active Code Status Order ID Comments User Context       Not on file            Allergies:   Amoxicillin, Penicillins, and Sulfa antibiotics    Isolation:   No active isolations    Intake and Output  No intake or output data in the 24 hours ending 11/04/23 1119    Weight:       11/04/23  0416   Weight: 59.4 kg (131 lb)       Most recent vitals:   Vitals:    11/04/23 0731 11/04/23 0830 11/04/23 0831 11/04/23 0931   BP: 107/66  101/71 101/63   BP Location:       Patient Position:       Pulse: 69  93 83   Resp: 16  16 16   Temp:       TempSrc:       SpO2: 95% 95% 95% 95%   Weight:       Height:           Active LDAs/IV Access:   Lines, Drains & Airways       Active LDAs       Name Placement date Placement time Site Days    Peripheral IV 11/04/23 0550 Posterior;Right Hand 11/04/23  0550  Hand  less than 1                    Labs (abnormal labs have a star):   Labs Reviewed   BASIC METABOLIC PANEL - Abnormal; Notable for the following components:       Result Value    Glucose 117 (*)     Sodium 135 (*)     All other components within normal limits    Narrative:     GFR Normal >60  Chronic Kidney Disease <60  Kidney Failure <15     CK - Abnormal; Notable for the following components:    Creatine Kinase 255 (*)     All other components within normal limits   URINE DRUG SCREEN - Abnormal; Notable for the following components:    Amphet/Methamphet, Screen Positive (*)     All other components within normal limits    Narrative:     Negative Thresholds Per  "Drugs Screened:    Amphetamines                 500 ng/ml  Barbiturates                 200 ng/ml  Benzodiazepines              100 ng/ml  Cocaine                      300 ng/ml  Methadone                    300 ng/ml  Opiates                      300 ng/ml  Oxycodone                    100 ng/ml  THC                           50 ng/ml  Fentanyl                       5 ng/ml      The Normal Value for all drugs tested is negative. This report includes final unconfirmed screening results to be used for medical treatment purposes only. Unconfirmed results must not be used for non-medical purposes such as employment or legal testing. Clinical consideration should be applied to any drug of abuse test, particularly when unconfirmed results are used.           CBC WITH AUTO DIFFERENTIAL - Abnormal; Notable for the following components:    WBC 17.06 (*)     Neutrophil % 81.1 (*)     Lymphocyte % 11.4 (*)     Immature Grans % 0.6 (*)     Neutrophils, Absolute 13.84 (*)     Monocytes, Absolute 0.92 (*)     Immature Grans, Absolute 0.10 (*)     All other components within normal limits   HCG, SERUM, QUALITATIVE - Normal   PROCALCITONIN - Normal    Narrative:     As a Marker for Sepsis (Non-Neonates):    1. <0.5 ng/mL represents a low risk of severe sepsis and/or septic shock.  2. >2 ng/mL represents a high risk of severe sepsis and/or septic shock.    As a Marker for Lower Respiratory Tract Infections that require antibiotic therapy:    PCT on Admission    Antibiotic Therapy       6-12 Hrs later    >0.5                Strongly Recommended  >0.25 - <0.5        Recommended   0.1 - 0.25          Discouraged              Remeasure/reassess PCT  <0.1                Strongly Discouraged     Remeasure/reassess PCT    As 28 day mortality risk marker: \"Change in Procalcitonin Result\" (>80% or <=80%) if Day 0 (or Day 1) and Day 4 values are available. Refer to http://www.[employer]-pct-calculator.com    Change in PCT <=80%  A decrease of " PCT levels below or equal to 80% defines a positive change in PCT test result representing a higher risk for 28-day all-cause mortality of patients diagnosed with severe sepsis for septic shock.    Change in PCT >80%  A decrease of PCT levels of more than 80% defines a negative change in PCT result representing a lower risk for 28-day all-cause mortality of patients diagnosed with severe sepsis or septic shock.      LACTIC ACID, PLASMA - Normal   BLOOD CULTURE   BLOOD CULTURE   ETHANOL   CBC AND DIFFERENTIAL    Narrative:     The following orders were created for panel order CBC & Differential.  Procedure                               Abnormality         Status                     ---------                               -----------         ------                     CBC Auto Differential[[id number]]        Abnormal            Final result                 Please view results for these tests on the individual orders.       EKG:   No orders to display       Meds given in ED:   Medications   sodium chloride 0.9 % flush 10 mL (has no administration in time range)   Pharmacy to dose vancomycin (has no administration in time range)   sodium chloride 0.9 % flush 10 mL (has no administration in time range)   sodium chloride 0.9 % flush 10 mL (has no administration in time range)   sodium chloride 0.9 % infusion 40 mL (has no administration in time range)   acetaminophen (TYLENOL) tablet 650 mg (has no administration in time range)     Or   acetaminophen (TYLENOL) 160 MG/5ML oral solution 650 mg (has no administration in time range)     Or   acetaminophen (TYLENOL) suppository 650 mg (has no administration in time range)   HYDROcodone-acetaminophen (NORCO) 5-325 MG per tablet 1 tablet (has no administration in time range)   ondansetron (ZOFRAN) tablet 4 mg (has no administration in time range)     Or   ondansetron (ZOFRAN) injection 4 mg (has no administration in time range)   famotidine (PEPCID) injection 20 mg (has no  administration in time range)   sodium chloride 0.9 % bolus 1,000 mL (0 mL Intravenous Stopped 11/4/23 0818)   vancomycin 1250 mg/250 mL 0.9% NS IVPB ([facility]) (0 mg Intravenous Stopped 11/4/23 0948)       Imaging results:  US Nonvascular Extremity Limited    Result Date: 11/4/2023   As described.    This report was finalized on 11/4/2023 10:28 AM by Dr. Jeff Da Silva M.D on Workstation: [email or web address]      XR ELBOW 2 VIEW LEFT    Result Date: 11/4/2023   As described.    This report was finalized on 11/4/2023 7:25 AM by Dr. Jeff Da Silva M.D on Workstation: [email or web address]       Ambulatory status:   - ad little    Social issues:   Social History     Socioeconomic History    Marital status:        NIH Stroke Scale:       Freya Turner RN  11/04/23 11:19 EDT

## 2023-11-04 NOTE — CONSULTS
"Nutrition Services    Patient Name:  Lay Restrepo  YOB: 1985  MRN: [id number]  Admit Date:  11/4/2023    Assessment Date:  11/04/23    Summary: Consult per RN Admit Screen:   Consult per RN Admit Screen for unsure weight loss and DARIAN. Pt found down in parking lot with no socks or shoes and here with L arm discomfort and found to have cellulitis with abscess in her L antecubital fossa from meth injection and MRSA. Pt currently off the floor in OR with surgery and NPO. Weight appears stable per EMR. BMI 21.8 Will follow for diet tolerance once diet advanced after OR.   Recommend:  Advance diet as tolerates to regular diet after OR.    Will follow per protocol.    CLINICAL NUTRITION ASSESSMENT      Reason for Assessment MST score 2+, Nurse Admission Screen     Diagnosis/Problem   Found down in parking lot without sock or shoes  Dx: cellulitis with abscess in L antecubital fossa from meth injection, MRSA   Medical/Surgical History History reviewed. No pertinent past medical history.    History reviewed. No pertinent surgical history.     Anthropometrics        Current Height  Current Weight  BMI kg/m2 Height: 165.1 cm (65\")  Weight: 59.4 kg (131 lb) (11/04/23 0416)  Body mass index is 21.8 kg/m².   Adjusted BMI (if applicable)    BMI Category Normal/Healthy (18.4 - 24.9)   Ideal Body Weight (IBW) 125 lb   Usual Body Weight (UBW) 125-131 lb   Weight Trend Stable   Weight History Wt Readings from Last 30 Encounters:   11/04/23 0416 59.4 kg (131 lb)   09/13/23 2235 59.6 kg (131 lb 6.3 oz)   09/11/23 1657 56.7 kg (125 lb)      --  Labs       Pertinent Labs    Results from last 7 days   Lab Units 11/04/23  0550   SODIUM mmol/L 135*   POTASSIUM mmol/L 3.7   CHLORIDE mmol/L 102   CO2 mmol/L 25.0   BUN mg/dL 17   CREATININE mg/dL 0.81   CALCIUM mg/dL 8.8   GLUCOSE mg/dL 117*     Results from last 7 days   Lab Units 11/04/23  0550   HEMOGLOBIN g/dL 13.0   HEMATOCRIT % 39.0   WBC 10*3/mm3 17.06*     Results " "from last 7 days   Lab Units 11/04/23  0550   PLATELETS 10*3/mm3 275     No results found for: \"COVID19\"  No results found for: \"HGBA1C\"       Medications           Scheduled Medications famotidine, 20 mg, Intravenous, Q12H  [MAR Hold] sodium chloride, 10 mL, Intravenous, Q12H  sodium chloride, 3 mL, Intravenous, Q12H  vancomycin, 1,000 mg, Intravenous, Q12H       Infusions lactated ringers, 9 mL/hr, Last Rate: 9 mL/hr (11/04/23 1402)  Pharmacy to dose vancomycin,        PRN Medications   [MAR Hold] acetaminophen **OR** [MAR Hold] acetaminophen **OR** [MAR Hold] acetaminophen    fentanyl    [MAR Hold] HYDROcodone-acetaminophen    [MAR Hold] influenza vaccine    lidocaine    midazolam    [MAR Hold] ondansetron **OR** [DISCONTINUED] ondansetron    Pharmacy to dose vancomycin    [COMPLETED] Insert Peripheral IV **AND** [MAR Hold] sodium chloride    [MAR Hold] sodium chloride    sodium chloride    [MAR Hold] sodium chloride     Physical Findings          General Findings alert, oriented, room air, tremors   Oral/Mouth Cavity edentulous   Edema  upper extremity, 2+ (mild)   Gastrointestinal non-distended , normoactive   Skin  cellulitis, other: L arm abscess   Tubes/Drains/Lines none   NFPE Not indicated at this time   --  Current Nutrition Orders & Evaluation of Intake       Oral Nutrition     Food Allergies NKFA   Current PO Diet NPO Diet NPO Type: Strict NPO   Supplement n/a   PO Evaluation     % PO Intake N/a    Factors Affecting Intake: decreased appetite, Other:meth abuse   --  PES STATEMENT / NUTRITION DIAGNOSIS      Nutrition Dx Problem  Problem: Inadequate Protein Energy Intake  Etiology: Factors Affecting Nutrition - DARIAN, drug abuse    Signs/Symptoms: NPO and Report of Minimal PO Intake     NUTRITION INTERVENTION / PLAN OF CARE      Intervention Goal(s) Maintain nutrition status, Reduce/improve symptoms, Meet estimated needs, Initiate feeding/diet, Establish PO intake, Tolerate PO , Maintain weight, and PO " intake goal %: 75%         RD Intervention/Action Await initiation/advancement of PO diet, Continue to monitor, Care plan reviewed, and Recommend/order: see below   --      Prescription/Orders:       PO Diet ADAT to regular diet after OR      Supplements       Enteral Nutrition       Parenteral Nutrition    New Prescription Ordered? No, recommended   --      Monitor/Evaluation Per protocol   Discharge Plan/Needs Pending clinical course   --    RD to follow per protocol.      Electronically signed by:  Heather Benites RD  11/04/23 14:42 EDT

## 2023-11-04 NOTE — ANESTHESIA POSTPROCEDURE EVALUATION
Patient: Lay Restrepo    Procedure Summary       Date: 11/04/23 Room / Location: [facility] OR  / [facility] MAIN OR    Anesthesia Start: 1810 Anesthesia Stop: 1853    Procedure: INCISION AND DRAINAGE UPPER EXTREMITY (Left: Arm Upper) Diagnosis:     Surgeons: Javed Lim MD Provider: Ludivina Carreon MD    Anesthesia Type: regional ASA Status: 2            Anesthesia Type: regional    Vitals  Vitals Value Taken Time   BP 95/65 11/04/23 1850   Temp 36.6 °C (97.8 °F) 11/04/23 1845   Pulse 58 11/04/23 1852   Resp     SpO2 100 % 11/04/23 1852   Vitals shown include unfiled device data.        Post Anesthesia Care and Evaluation    Patient location during evaluation: bedside  Patient participation: complete - patient participated  Level of consciousness: awake  Pain management: adequate    Airway patency: patent  Anesthetic complications: No anesthetic complications    Cardiovascular status: acceptable  Respiratory status: acceptable  Hydration status: acceptable

## 2023-11-04 NOTE — PROGRESS NOTES
"[facility] Clinical Pharmacy Services: Vancomycin Pharmacokinetic Initial Consult Note    Lay Restrepo is a 38 y.o. female who is on day 1 of pharmacy to dose vancomycin.    Indication: Skin and Soft Tissue  Consulting Provider: Dr. Gaffney   Planned Duration of Therapy: 7 days   Loading Dose Ordered or Given: 1250 mg on 11/4 at 0833  Culture/Source: In process   Target: -600 mg/L.hr   Other Antimicrobials: None at this time     Vitals/Labs  Ht: 165.1 cm (65\"); Wt: 59.4 kg (131 lb)  Temp Readings from Last 1 Encounters:   11/04/23 97.4 °F (36.3 °C) (Tympanic)    Estimated Creatinine Clearance: 89.2 mL/min (by C-G formula based on SCr of 0.81 mg/dL).       Results from last 7 days   Lab Units 11/04/23  0550   CREATININE mg/dL 0.81   WBC 10*3/mm3 17.06*     Assessment/Plan:    Vancomycin Dose: Patient loaded with vancomycin 1250 mg x 1 prior to consult. I will follow this with vancomycin 1000 mg every 12 hours to target an AUC of 556 mg/L*hr.  Vanc Trough has been ordered for 11/6 at 0830     Pharmacy will follow patient's kidney function and will adjust doses and obtain levels as necessary. Thank you for involving pharmacy in this patient's care. Please contact pharmacy with any questions or concerns.                           Cheyenne Gowers, [facility]  Clinical Pharmacist   "

## 2023-11-04 NOTE — ANESTHESIA PROCEDURE NOTES
Peripheral Block      Patient reassessed immediately prior to procedure    Patient location during procedure: pre-op  Start time: 11/4/2023 2:55 PM  Stop time: 11/4/2023 3:10 PM  Reason for block: primary anesthetic  Performed by  Anesthesiologist: Jeff Dozier MD  Preanesthetic Checklist  Completed: patient identified, IV checked, site marked, risks and benefits discussed, surgical consent, monitors and equipment checked, pre-op evaluation and timeout performed  Prep:  Pt Position: supine  Sterile barriers:gloves and cap  Prep: ChloraPrep  Patient monitoring: blood pressure monitoring, continuous pulse oximetry and EKG  Procedure    Sedation: yes    Guidance:ultrasound guided    ULTRASOUND INTERPRETATION.  Using ultrasound guidance a 21 G gauge needle was placed in close proximity to the brachial plexus nerve, at which point, under ultrasound guidance anesthetic was injected in the area of the nerve and spread of the anesthesia was seen on ultrasound in close proximity thereto.  There were no abnormalities seen on ultrasound; a digital image was taken; and the patient tolerated the procedure with no complications. Images:still images obtained    Laterality:left  Block Type:supraclavicular  Injection Technique:single-shot  Needle Type:echogenic  Needle Gauge:21 G  Resistance on Injection: none    Medications Used: dexamethasone (DECADRON) injection - Injection   4 mg - 11/4/2023 3:10:00 PM  ropivacaine (NAROPIN) 0.5 % injection - Injection   20 mL - 11/4/2023 3:10:00 PM      Post Assessment  Injection Assessment: negative aspiration for heme, no paresthesia on injection and incremental injection  Patient Tolerance:comfortable throughout block  Complications:no  Additional Notes  Ultrasound guidance was used to view and verify needle placement and medication disbursement.

## 2023-11-04 NOTE — ED PROVIDER NOTES
EMERGENCY DEPARTMENT ENCOUNTER    Room Number:  08/08  Date seen:  11/4/2023  PCP: Provider, No Known      HPI:  Chief Complaint: Altered mental status   A complete HPI/ROS/PMH/PSH/SH/FH are unobtainable due to: intoxicated   Context: Lay Restrepo is a 38 y.o. female who presents to the ED c/o altered mental status. [facility] found patient rolling around without shoes on in a parking lot off of [facility]. She tells me she has pain on the right side. When questioned further she tells me it is the left side. Then the left arm. She endorses drug use and admits to injection of methamphetamines 3 days prior. Denies alcohol use. States she is homeless. She does report that she is concerned she might be pregnant as her last menstrual period was 6 months ago.          PAST MEDICAL HISTORY  Active Ambulatory Problems     Diagnosis Date Noted    No Active Ambulatory Problems     Resolved Ambulatory Problems     Diagnosis Date Noted    No Resolved Ambulatory Problems     No Additional Past Medical History         PAST SURGICAL HISTORY  No past surgical history on file.      FAMILY HISTORY  No family history on file.      SOCIAL HISTORY  Social History     Socioeconomic History    Marital status:          ALLERGIES  Amoxicillin, Penicillins, and Sulfa antibiotics        REVIEW OF SYSTEMS  Review of Systems   As above   All systems reviewed and negative except for those discussed in HPI.       PHYSICAL EXAM  ED Triage Vitals   Temp Heart Rate Resp BP SpO2   11/04/23 0415 11/04/23 0416 11/04/23 0416 11/04/23 0416 11/04/23 0416   97.4 °F (36.3 °C) 86 18 112/62 100 %      Temp src Heart Rate Source Patient Position BP Location FiO2 (%)   11/04/23 0415 11/04/23 0416 11/04/23 0416 11/04/23 0416 --   Tympanic Monitor Lying Right arm        Physical Exam      GENERAL: alert no acute distress  HENT: nares patent. Edentulous   EYES: no scleral icterus  CV: regular rhythm, normal rate  RESPIRATORY: normal  effort  ABDOMEN: soft  MUSCULOSKELETAL: no deformity. There is firm induration of left antecubital fossa with some erythema and tenderness. Radial pulse 2+  NEURO: alert, moves all extremities, follows commands  PSYCH:  calm, cooperative  SKIN: warm, dry    Vital signs and nursing notes reviewed.          LAB RESULTS  Recent Results (from the past 24 hour(s))   Basic Metabolic Panel    Collection Time: 11/04/23  5:50 AM    Specimen: Blood   Result Value Ref Range    Glucose 117 (H) 65 - 99 mg/dL    BUN 17 6 - 20 mg/dL    Creatinine 0.81 0.57 - 1.00 mg/dL    Sodium 135 (L) 136 - 145 mmol/L    Potassium 3.7 3.5 - 5.2 mmol/L    Chloride 102 98 - 107 mmol/L    CO2 25.0 22.0 - 29.0 mmol/L    Calcium 8.8 8.6 - 10.5 mg/dL    BUN/Creatinine Ratio 21.0 7.0 - 25.0    Anion Gap 8.0 5.0 - 15.0 mmol/L    eGFR 96.0 >60.0 mL/min/1.73   CK    Collection Time: 11/04/23  5:50 AM    Specimen: Blood   Result Value Ref Range    Creatine Kinase 255 (H) 20 - 180 U/L   hCG, Serum, Qualitative    Collection Time: 11/04/23  5:50 AM    Specimen: Blood   Result Value Ref Range    HCG Qualitative Negative Negative   Ethanol    Collection Time: 11/04/23  5:50 AM    Specimen: Blood   Result Value Ref Range    Ethanol <10 0 - 10 mg/dL    Ethanol % <0.010 %   CBC Auto Differential    Collection Time: 11/04/23  5:50 AM    Specimen: Blood   Result Value Ref Range    WBC 17.06 (H) 3.40 - 10.80 10*3/mm3    RBC 4.40 3.77 - 5.28 10*6/mm3    Hemoglobin 13.0 12.0 - 15.9 g/dL    Hematocrit 39.0 34.0 - 46.6 %    MCV 88.6 79.0 - 97.0 fL    MCH 29.5 26.6 - 33.0 pg    MCHC 33.3 31.5 - 35.7 g/dL    RDW 12.4 12.3 - 15.4 %    RDW-SD 40.3 37.0 - 54.0 fl    MPV 10.7 6.0 - 12.0 fL    Platelets 275 140 - 450 10*3/mm3    Neutrophil % 81.1 (H) 42.7 - 76.0 %    Lymphocyte % 11.4 (L) 19.6 - 45.3 %    Monocyte % 5.4 5.0 - 12.0 %    Eosinophil % 1.2 0.3 - 6.2 %    Basophil % 0.3 0.0 - 1.5 %    Immature Grans % 0.6 (H) 0.0 - 0.5 %    Neutrophils, Absolute 13.84 (H) 1.70 -  7.00 10*3/mm3    Lymphocytes, Absolute 1.95 0.70 - 3.10 10*3/mm3    Monocytes, Absolute 0.92 (H) 0.10 - 0.90 10*3/mm3    Eosinophils, Absolute 0.20 0.00 - 0.40 10*3/mm3    Basophils, Absolute 0.05 0.00 - 0.20 10*3/mm3    Immature Grans, Absolute 0.10 (H) 0.00 - 0.05 10*3/mm3    nRBC 0.0 0.0 - 0.2 /100 WBC   Procalcitonin    Collection Time: 11/04/23  5:50 AM    Specimen: Blood   Result Value Ref Range    Procalcitonin 0.06 0.00 - 0.25 ng/mL   Lactic Acid, Plasma    Collection Time: 11/04/23  7:58 AM    Specimen: Arm, Left; Blood   Result Value Ref Range    Lactate 1.0 0.5 - 2.0 mmol/L   Urine Drug Screen - Urine, Clean Catch    Collection Time: 11/04/23  8:24 AM    Specimen: Urine, Clean Catch   Result Value Ref Range    Amphet/Methamphet, Screen Positive (A) Negative    Barbiturates Screen, Urine Negative Negative    Benzodiazepine Screen, Urine Negative Negative    Cocaine Screen, Urine Negative Negative    Opiate Screen Negative Negative    THC, Screen, Urine Negative Negative    Methadone Screen, Urine Negative Negative    Oxycodone Screen, Urine Negative Negative    Fentanyl, Urine Negative Negative       Ordered the above labs and reviewed the results.        RADIOLOGY  US Nonvascular Extremity Limited    Result Date: 11/4/2023  US NONVASCULAR EXTREMITY LIMITED-  INDICATIONS: Possible subcutaneous abscess.  TECHNIQUE: LIMITED SUPERFICIAL extremity ultrasound.  COMPARISON: Correlated with x-ray from 11/4/2023  FINDINGS:  A heterogeneous hypoechoic region in the subcutaneous soft tissues in the area of soft tissue swelling, at the antecubital fossa could represent phlegmonous change or potentially complex subcutaneous fluid collection, with some increased vascularity/hyperemia apparent in this region. This area was measured at 3.5 x 1.3 cm on image #8 of series 1. Within this region, several tiny bright foci could represent soft tissue gas/gas producing infection. The soft tissues appear diffusely edematous. No  other collections or lesions are identified. If further imaging evaluation is indicated, enhanced cross-sectional imaging could be obtained.       As described.    This report was finalized on 11/4/2023 10:28 AM by Dr. Jeff Da Silva M.D on Workstation: [employer]      XR ELBOW 2 VIEW LEFT    Result Date: 11/4/2023  XR ELBOW 2 VW LEFT-  INDICATIONS: Redness and swelling   TECHNIQUE: 2 VIEWS OF THE LEFT ELBOW  COMPARISON: None available  FINDINGS:  Soft tissue swelling laterally at the elbow may be evidence of inflammation, infection, injury, correlate clinically. No acute fracture, erosion, dislocation, or elbow effusion. If there is further clinical concern, MRI can be considered for further evaluation.       As described.    This report was finalized on 11/4/2023 7:25 AM by Dr. Jeff Da Silva M.D on Workstation: [employer]       Ordered the above noted radiological studies. Reviewed by me in PACS.          PROCEDURES  Procedures          MEDICATIONS GIVEN IN ER  Medications   sodium chloride 0.9 % flush 10 mL (has no administration in time range)   Pharmacy to dose vancomycin (has no administration in time range)   sodium chloride 0.9 % flush 10 mL (has no administration in time range)   sodium chloride 0.9 % flush 10 mL (has no administration in time range)   sodium chloride 0.9 % infusion 40 mL (has no administration in time range)   acetaminophen (TYLENOL) tablet 650 mg (has no administration in time range)     Or   acetaminophen (TYLENOL) 160 MG/5ML oral solution 650 mg (has no administration in time range)     Or   acetaminophen (TYLENOL) suppository 650 mg (has no administration in time range)   HYDROcodone-acetaminophen (NORCO) 5-325 MG per tablet 1 tablet (has no administration in time range)   ondansetron (ZOFRAN) tablet 4 mg (has no administration in time range)     Or   ondansetron (ZOFRAN) injection 4 mg (has no administration in time range)   famotidine (PEPCID) injection 20 mg (has no  administration in time range)   vancomycin (VANCOCIN) 1000 mg/200 mL dextrose 5% IVPB (has no administration in time range)   sodium chloride 0.9 % bolus 1,000 mL (0 mL Intravenous Stopped 11/4/23 0818)   vancomycin 1250 mg/250 mL 0.9% NS IVPB ([facility]) (0 mg Intravenous Stopped 11/4/23 0948)         MEDICAL DECISION MAKING, PROGRESS, and CONSULTS    All labs have been independently reviewed by me.  All radiology studies have been reviewed by me and discussed with radiologist dictating the report.   EKG's independently viewed and interpreted by me.  Discussion below represents my analysis of pertinent findings related to patient's condition, differential diagnosis, treatment plan and final disposition.      Orders placed during this visit:  Orders Placed This Encounter   Procedures    Blood Culture - Blood,    Blood Culture - Blood,    XR ELBOW 2 VIEW LEFT    US Nonvascular Extremity Limited    Basic Metabolic Panel    CK    hCG, Serum, Qualitative    Ethanol    Urine Drug Screen - Urine, Clean Catch    CBC Auto Differential    Procalcitonin    Lactic Acid, Plasma    Vancomycin, Trough    NPO Diet NPO Type: Strict NPO    Pulse Oximetry, Continuous    Vital Signs    Activity - Ad Karla    Intake & Output    Oral Care    Saline Lock & Maintain IV Access    Place Sequential Compression Device    Maintain Sequential Compression Device    Okay for regular diet post I&D  Nursing Communication    LHA (on-call MD unless specified) Details    Ortho (on-call MD unless specified)    Surgery (on-call MD unless specified)    LHA (on-call MD unless specified) Details    Insert Peripheral IV    Insert Peripheral IV    Initiate Observation Status    CBC & Differential         Additional sources:  - Discussed/obtained information from independent historians:     Additional information was obtained to confirm the patient's history.    - External (non-ED) record review: Seen for cellulitis of RUE in August and September 2023. No admission  "from ER. Rx for doxycyline.     - Chronic or social conditions impacting care: Homelessness, drug abuse         Differential diagnosis:    Cellulitis, abscess, endocarditis, Metabolic encephalopathy, ELIAS, dehydration, anemia, arrhythmia, AMI, USA, viral syndrome, COVID-19, pneumonia, brain tumor, withdrawal, polypharmacy, substance abuse        Independent interpretation of labs, radiology studies, and discussions with consultants: Dr Gaffney, Dr Ortiz, Dr Lim     Patient presents with altered mental status after found \"rolling in a parking lot \".  She reports left arm pain.  She does have what appears to be a developing abscess of the left antecubital fossa.  Given the amount of firm induration and partial erythemic appearance, would suspect this is acute on chronic.  She has a long history of substance abuse.  Her white blood cell count was 17,000.  She does appear to have a chronic leukocytosis but 17 is higher than her last 2 draws which were 13 and 15.  She does have an allergy to penicillins and sulfa and states it made her have difficulty breathing.  I do not see record of cephalosporin administration.  She is unsure if she has ever had these medications.  Given the above she was empirically treated with vancomycin and admitted to the hospital for further evaluation and treatment options.  Discussed the patient with general surgery and orthopedics.  General surgery, Dr. Orozco did agree to consult on the patient and recommended ultrasound of the left upper extremity.  She was ultimately admitted to the hospital service.  Ultrasound pending at time of admission.  ED Course as of 11/04/23 1144   Sat Nov 04, 2023   0517 First look: Patient has PMH of IV drug abuse.  She admits to using ice tonight.  According to EMS patient was found down in a parking lot without any shoes and socks.  At [facility] [employer] [facility] and down and they evaluated the patient in the field.  Patient told EMS that she had used meth 3 " days ago and states she may be pregnant.  Patient does complain of left arm pain at an injection site.  She denies hitting her head or sustaining any musculoskeletal injury.  She is here for further evaluation [RC]   0527 . [RC]   0745 Discussed pt with Dr Ortiz, no hand coverage this weekend. Recommends consulting General Surgery as they will cover superficial extremity abscesses. [JS]   0944 Discussed pt with Dr Lim who agrees to consult with general surgery. Recommends US of LUE. NPO [JS]   1005 Discussed pt with Dr Gaffney who agrees to admit the pt  [JS]   1035 Antibiotics initiated with vancomycin. Pt unsure of reaction to PCN. States it decreased her oxygen. Unsure if she has had rocephin, cephalexin, or other cephalosporins. Affirms allergy to sulfa.  [JS]      ED Course User Index  [JS] Flaca Rapp, DAVID  [RC] Eleazar Cruz III, PA                  PPE: The patient wore a mask throughout the entire encounter. I wore a well-fitting mask.    DIAGNOSIS  Final diagnoses:   Abscess of left upper extremity   Left arm pain   IV drug abuse   Leukocytosis, unspecified type         DISPOSITION  Admission       Latest Documented Vital Signs:  As of 11:44 EDT  BP- 101/63 HR- 83 Temp- 97.4 °F (36.3 °C) (Tympanic) O2 sat- 95%      --    Please note that portions of this were completed with a voice recognition program.       Note Disclaimer: At [facility], we believe that sharing information builds trust and better relationships. You are receiving this note because you are receiving care at [facility] or recently visited. It is possible you will see health information before a provider has talked with you about it. This kind of information can be easy to misunderstand. To help you fully understand what it means for your health, we urge you to discuss this note with your provider.         Flaca Rapp APRN  11/04/23 2063

## 2023-11-05 PROCEDURE — 99024 POSTOP FOLLOW-UP VISIT: CPT | Performed by: SURGERY

## 2023-11-05 PROCEDURE — 25010000002 VANCOMYCIN PER 500 MG: Performed by: SURGERY

## 2023-11-05 PROCEDURE — G0378 HOSPITAL OBSERVATION PER HR: HCPCS

## 2023-11-05 PROCEDURE — 25010000002 HYDROMORPHONE PER 4 MG: Performed by: ANESTHESIOLOGY

## 2023-11-05 RX ADMIN — Medication 10 ML: at 20:44

## 2023-11-05 RX ADMIN — OXYCODONE AND ACETAMINOPHEN 1 TABLET: 7.5; 325 TABLET ORAL at 17:35

## 2023-11-05 RX ADMIN — OXYCODONE AND ACETAMINOPHEN 1 TABLET: 7.5; 325 TABLET ORAL at 07:53

## 2023-11-05 RX ADMIN — FAMOTIDINE 20 MG: 10 INJECTION INTRAVENOUS at 00:22

## 2023-11-05 RX ADMIN — VANCOMYCIN HYDROCHLORIDE 1000 MG: 1 INJECTION, SOLUTION INTRAVENOUS at 20:39

## 2023-11-05 RX ADMIN — OXYCODONE AND ACETAMINOPHEN 1 TABLET: 7.5; 325 TABLET ORAL at 23:56

## 2023-11-05 RX ADMIN — OXYCODONE AND ACETAMINOPHEN 1 TABLET: 7.5; 325 TABLET ORAL at 13:51

## 2023-11-05 RX ADMIN — HYDROMORPHONE HYDROCHLORIDE 0.5 MG: 1 INJECTION, SOLUTION INTRAMUSCULAR; INTRAVENOUS; SUBCUTANEOUS at 04:07

## 2023-11-05 RX ADMIN — Medication 10 ML: at 00:22

## 2023-11-05 RX ADMIN — FAMOTIDINE 20 MG: 10 INJECTION INTRAVENOUS at 20:39

## 2023-11-05 RX ADMIN — FAMOTIDINE 20 MG: 10 INJECTION INTRAVENOUS at 07:52

## 2023-11-05 RX ADMIN — VANCOMYCIN HYDROCHLORIDE 1000 MG: 1 INJECTION, SOLUTION INTRAVENOUS at 00:22

## 2023-11-05 RX ADMIN — HYDROCODONE BITARTRATE AND ACETAMINOPHEN 1 TABLET: 5; 325 TABLET ORAL at 00:38

## 2023-11-05 RX ADMIN — VANCOMYCIN HYDROCHLORIDE 1000 MG: 1 INJECTION, SOLUTION INTRAVENOUS at 09:48

## 2023-11-05 RX ADMIN — Medication 10 ML: at 10:42

## 2023-11-05 NOTE — PLAN OF CARE
Goal Outcome Evaluation:  Plan of Care Reviewed With: patient        Progress: no change  Outcome Evaluation: VSS. Pt has been ambulating up adlib and been voiding per brp. PRN meds given with noted effect. Dressing is c/d/i. Educated on non-pharma pain management, falls precaution and infection prevention which need reinforcement. Will take note of any changes.

## 2023-11-05 NOTE — PROGRESS NOTES
POD 1 from I&D of left forearm abscess.  No surrounding cellulitis.  Recommend changing packing once daily until wound can no longer be packed.  I will sign off at this time.  Okay for discharge from my standpoint when medically appropriate.

## 2023-11-05 NOTE — PLAN OF CARE
Goal Outcome Evaluation:           Progress: improving  Outcome Evaluation: 37 y/o s/POD 1 Incision and drainage Left upper extremity. AOX4, odd behavior noted. Pt up ad little. Voiding function intact via BRP. Neuro checks WNL, no c/o numbness/tingling. Wound changed, packed w/ iodoform gauze, tolerated well. Pain managed via PO pills. PIV taken out by patient this afternoon, PIV team notified. Contact precautions maintained. Needs met. VSS. RA. Educated pt on importance of infection prevention. Plan to d/c home pending MD clearance. Will CTM.

## 2023-11-06 VITALS
OXYGEN SATURATION: 100 % | HEART RATE: 52 BPM | SYSTOLIC BLOOD PRESSURE: 89 MMHG | HEIGHT: 65 IN | BODY MASS INDEX: 21.83 KG/M2 | TEMPERATURE: 97.4 F | DIASTOLIC BLOOD PRESSURE: 59 MMHG | RESPIRATION RATE: 16 BRPM | WEIGHT: 131 LBS

## 2023-11-06 PROBLEM — L03.114 CELLULITIS OF LEFT UPPER ARM: Status: RESOLVED | Noted: 2023-11-04 | Resolved: 2023-11-06

## 2023-11-06 PROBLEM — L03.114 CELLULITIS OF LEFT UPPER EXTREMITY: Status: RESOLVED | Noted: 2023-11-04 | Resolved: 2023-11-06

## 2023-11-06 LAB
CREAT SERPL-MCNC: 0.79 MG/DL (ref 0.57–1)
EGFRCR SERPLBLD CKD-EPI 2021: 98.9 ML/MIN/1.73
VANCOMYCIN TROUGH SERPL-MCNC: 9.6 MCG/ML (ref 5–20)

## 2023-11-06 PROCEDURE — 36415 COLL VENOUS BLD VENIPUNCTURE: CPT | Performed by: HOSPITALIST

## 2023-11-06 PROCEDURE — G0378 HOSPITAL OBSERVATION PER HR: HCPCS

## 2023-11-06 PROCEDURE — 25010000002 VANCOMYCIN PER 500 MG: Performed by: SURGERY

## 2023-11-06 PROCEDURE — 80202 ASSAY OF VANCOMYCIN: CPT | Performed by: SURGERY

## 2023-11-06 PROCEDURE — 82565 ASSAY OF CREATININE: CPT | Performed by: HOSPITALIST

## 2023-11-06 RX ORDER — ACETAMINOPHEN 325 MG/1
650 TABLET ORAL EVERY 4 HOURS PRN
Start: 2023-11-06

## 2023-11-06 RX ADMIN — VANCOMYCIN HYDROCHLORIDE 1000 MG: 1 INJECTION, SOLUTION INTRAVENOUS at 09:26

## 2023-11-06 RX ADMIN — FAMOTIDINE 20 MG: 10 INJECTION INTRAVENOUS at 08:53

## 2023-11-06 RX ADMIN — Medication 10 ML: at 08:53

## 2023-11-06 NOTE — DISCHARGE SUMMARY
[facility]    Date of Discharge:  11/6/2023    PCP: Provider, No Known    Discharge Diagnosis:   Active Hospital Problems    Diagnosis  POA    **Abscess of antecubital fossa [L02.419]  Unknown    MRSA (methicillin resistant Staphylococcus aureus) [A49.02]  Unknown    Methamphetamine abuse [F15.10]  Unknown      Resolved Hospital Problems    Diagnosis Date Resolved POA    Cellulitis of left upper arm [L03.114] 11/06/2023 Unknown    Cellulitis of left upper extremity [L03.114] 11/06/2023 Yes     Procedure(s):  INCISION AND DRAINAGE UPPER EXTREMITY     Consults       Date and Time Order Name Status Description    11/4/2023  9:46 AM LHA (on-call MD unless specified) Details      11/4/2023  9:06 AM Surgery (on-call MD unless specified) Completed     11/4/2023  7:38 AM [facility] (on-call MD unless specified) Details            Hospital Course  38 y.o. female who ultimately was admitted for abscess to her left AC fossa secondary to self-injection of methamphetamine.  She has a past history of MRSA and was blanket with IV vancomycin but ultimately surgical consult was obtained and Dr. Lim was nice enough to perform an I&D on 11/4/2023.  As of today her wound is open and packed but previous cellulitis resolved.  Given the resolution of cellulitis, no further antibiotics will be medically endorsed and surgical correction should be sufficient.  I counseled the patient to leave this area of her arm alone when it comes to self injecting illicit and illegal IV narcotics and time will tell on what she does postdischarge.  We did utilize as needed oral pain medication on an inpatient basis but no narcotics will be prescribed post discharge.  She is completely alert and oriented x3 and medically stable to be discharged from the hospital.  Vital signs are stable as well as afebrile but she typically runs low at baseline in regards to blood pressure and no sepsis has been  appreciated on this admission as blood cultures have shown no growth to date x2 and wound culture was also negative.      Temp:  [97.2 °F (36.2 °C)-97.6 °F (36.4 °C)] 97.4 °F (36.3 °C)  Heart Rate:  [52-62] 52  Resp:  [16] 16  BP: ()/(52-68) 89/59  Body mass index is 21.8 kg/m².    Physical Exam  HENT:      Head: Normocephalic.   Cardiovascular:      Rate and Rhythm: Normal rate and regular rhythm.   Pulmonary:      Effort: Pulmonary effort is normal. No respiratory distress.      Breath sounds: Normal breath sounds.   Abdominal:      General: Bowel sounds are normal.      Palpations: Abdomen is soft.   Skin:     General: Skin is warm and dry.      Comments: Resolved cellulitis to left AC with well packed wound   Neurological:      Mental Status: She is alert and oriented to person, place, and time. Mental status is at baseline.      Cranial Nerves: No cranial nerve deficit.       Disposition: Home or Self Care       Discharge Medications        New Medications        Instructions Start Date   acetaminophen 325 MG tablet  Commonly known as: TYLENOL   650 mg, Oral, Every 4 Hours PRN             Stop These Medications      doxycycline 100 MG capsule  Commonly known as: MONODOX               Additional Instructions for the Follow-ups that You Need to Schedule       Discharge Follow-up with PCP   As directed       Currently Documented PCP:    Provider, No Known    PCP Phone Number:    None     Follow Up Details: Surgery per their recommendations.  PCP as needed               Follow-up Information       Provider, No Known .    Why: Surgery per their recommendations.  PCP as needed  Contact information:  [facility] [place] [id number]                          No future appointments.  Pending Labs       Order Current Status    Anaerobic Culture - Wound, Forearm, Left In process    Blood Culture - Blood, Arm, Left Preliminary result    Blood Culture - Blood, Arm, Right Preliminary result    Wound Culture -  Wound, Forearm, Left Preliminary result           Carlos Gaffney MD  [facility]  11/06/23    Discharge time spent greater than 30 minutes.

## 2023-11-06 NOTE — PLAN OF CARE
Goal Outcome Evaluation:  Plan of Care Reviewed With: patient        Progress: improving  Outcome Evaluation: Pt has been ambulating up adlib and been voiding per brp. VSS. PRN meds given with noted effect. Dressing is c/d/i. Educated on non-pharma pain management, falls precaution and infection prevention which need reinforcement.Plan to d/c home pending.

## 2023-11-06 NOTE — CASE MANAGEMENT/SOCIAL WORK
Continued Stay Note  [facility]     Patient Name: Lay Restrepo  MRN: [id number]  Today's Date: 11/6/2023    Admit Date: 11/4/2023        Discharge Plan       Row Name 11/06/23 1624       Plan    Plan Comments DC orders in EPIC. Spoke with patient at bedside. She confirms that she is currently homeless and would like to go to a shelter. [facility] ( [phone]) and they do not currently have any female beds available. [facility] ( [address]) will have beds available 1st come, 1st serve @ 1530 and [facility] ( [address] [facility]) will have 1st come, 1st serve beds available @ 1500. Patient would prefer to try the [facility]. Provided her with a cab voucher for transport. Patient also provided a copy of [facility]. She was also given a bag with hygiene items and socks to take with her.    Final Discharge Disposition Code 01 - home or self-care    Final Note dc'd to shelter via [facility] cab                   Discharge Codes    No documentation.                 Expected Discharge Date and Time       Expected Discharge Date Expected Discharge Time    Nov 6, 2023               Ashley Avalos RN

## 2023-11-07 LAB
BACTERIA SPEC AEROBE CULT: NORMAL
GRAM STN SPEC: NORMAL
GRAM STN SPEC: NORMAL

## 2023-11-09 LAB
BACTERIA SPEC AEROBE CULT: NORMAL
BACTERIA SPEC AEROBE CULT: NORMAL
BACTERIA SPEC ANAEROBE CULT: NORMAL

## 2024-05-06 ENCOUNTER — HOSPITAL ENCOUNTER (EMERGENCY)
Facility: HOSPITAL | Age: 39
Discharge: HOME | End: 2024-05-07
Payer: COMMERCIAL

## 2024-05-06 VITALS
TEMPERATURE: 98.3 F | RESPIRATION RATE: 15 BRPM | HEART RATE: 97 BPM | OXYGEN SATURATION: 98 % | SYSTOLIC BLOOD PRESSURE: 138 MMHG | BODY MASS INDEX: 21.26 KG/M2 | HEIGHT: 63 IN | WEIGHT: 120 LBS | DIASTOLIC BLOOD PRESSURE: 83 MMHG

## 2024-05-06 DIAGNOSIS — F15.93 WITHDRAWAL FROM METHAMPHETAMINE: Primary | ICD-10-CM

## 2024-05-06 DIAGNOSIS — Z00.8 MEDICAL CLEARANCE FOR INCARCERATION: ICD-10-CM

## 2024-05-06 PROCEDURE — 99283 EMERGENCY DEPT VISIT LOW MDM: CPT

## 2024-05-06 PROCEDURE — 63710000001 ONDANSETRON ODT 4 MG TABLET DISPERSIBLE: Performed by: NURSE PRACTITIONER

## 2024-05-06 RX ORDER — METHOCARBAMOL 500 MG/1
500 TABLET, FILM COATED ORAL ONCE
Status: COMPLETED | OUTPATIENT
Start: 2024-05-06 | End: 2024-05-06

## 2024-05-06 RX ORDER — HYDROXYZINE HYDROCHLORIDE 25 MG/1
50 TABLET, FILM COATED ORAL ONCE
Status: COMPLETED | OUTPATIENT
Start: 2024-05-06 | End: 2024-05-06

## 2024-05-06 RX ORDER — ONDANSETRON 4 MG/1
4 TABLET, ORALLY DISINTEGRATING ORAL ONCE
Status: COMPLETED | OUTPATIENT
Start: 2024-05-06 | End: 2024-05-06

## 2024-05-06 RX ADMIN — ONDANSETRON 4 MG: 4 TABLET, ORALLY DISINTEGRATING ORAL at 23:49

## 2024-05-06 RX ADMIN — HYDROXYZINE HYDROCHLORIDE 50 MG: 25 TABLET, FILM COATED ORAL at 23:49

## 2024-05-06 RX ADMIN — METHOCARBAMOL 500 MG: 500 TABLET ORAL at 23:49

## 2024-05-07 NOTE — ED PROVIDER NOTES
Subjective   History of Present Illness  Patient is a 38-year-old female who states she uses methamphetamines daily but has not used for 2 days and comes in in police custody for medical clearance for long term-    Patient states she does not do any drugs other than methamphetamine-and then reports she did hit a crack pipe about 3 weeks ago as well.    She denies any opioids      Review of Systems   Constitutional:  Positive for diaphoresis.   Gastrointestinal:  Positive for nausea.   Musculoskeletal:  Positive for arthralgias.   Psychiatric/Behavioral:  The patient is nervous/anxious.        History reviewed. No pertinent past medical history.    Allergies   Allergen Reactions    Amoxicillin Unknown - High Severity    Penicillins Unknown - High Severity    Sulfa Antibiotics Unknown - High Severity       Past Surgical History:   Procedure Laterality Date    INCISION AND DRAINAGE ARM Left 11/4/2023    Procedure: INCISION AND DRAINAGE UPPER EXTREMITY;  Surgeon: Javed Lim MD;  Location: [facility];  Service: General;  Laterality: Left;       History reviewed. No pertinent family history.    Social History     Socioeconomic History    Marital status:    Tobacco Use    Smoking status: Every Day     Current packs/day: 0.50     Average packs/day: 0.5 packs/day for 7.0 years (3.5 ttl pk-yrs)     Types: Cigarettes   Vaping Use    Vaping status: Every Day   Substance and Sexual Activity    Drug use: Yes     Types: Amphetamines, Fentanyl, Methylphenidate, Methamphetamines    Sexual activity: Yes           Objective   Physical Exam  Vitals reviewed.   Constitutional:       Appearance: Normal appearance. She is well-developed and normal weight. She is diaphoretic.   HENT:      Head: Normocephalic and atraumatic.      Mouth/Throat:      Mouth: Mucous membranes are dry.   Eyes:      Conjunctiva/sclera: Conjunctivae normal.      Pupils: Pupils are equal, round, and reactive to light.   Cardiovascular:      Rate and  "Rhythm: Normal rate and regular rhythm.      Heart sounds: Normal heart sounds.   Pulmonary:      Effort: Pulmonary effort is normal. No respiratory distress.      Breath sounds: Normal breath sounds. No wheezing.   Abdominal:      General: Bowel sounds are normal.      Palpations: Abdomen is soft.      Tenderness: There is no abdominal tenderness.   Musculoskeletal:         General: No deformity. Normal range of motion.      Cervical back: Normal range of motion and neck supple.   Skin:     General: Skin is warm and dry.      Capillary Refill: Capillary refill takes less than 2 seconds.   Neurological:      Mental Status: She is alert and oriented to person, place, and time.      GCS: GCS eye subscore is 4. GCS verbal subscore is 5. GCS motor subscore is 6.      Motor: No weakness.   Psychiatric:         Attention and Perception: She is inattentive.         Mood and Affect: Mood normal. Mood is anxious.         Speech: Speech is rapid and pressured.         Behavior: Behavior normal. Behavior is agitated.         Judgment: Judgment is impulsive.         Procedures           ED Course                                 /83 (BP Location: Right arm, Patient Position: Lying)   Pulse 97   Temp 98.3 °F (36.8 °C) (Oral)   Resp 15   Ht 160 cm (63\")   Wt 54.4 kg (120 lb)   LMP  (LMP Unknown)   SpO2 98%   BMI 21.26 kg/m²   Labs Reviewed - No data to display  Medications   methocarbamol (ROBAXIN) tablet 500 mg (500 mg Oral Given 5/6/24 2349)   hydrOXYzine (ATARAX) tablet 50 mg (50 mg Oral Given 5/6/24 2349)   ondansetron ODT (ZOFRAN-ODT) disintegrating tablet 4 mg (4 mg Oral Given 5/6/24 2349)     No radiology results for the last day              Medical Decision Making  Patient was treated with Robaxin Zofran and hydroxyzine all comfort medications for withdrawal patient was medically cleared for incarceration she was ambulatory in no acute distress at discharge    Problems Addressed:  Medical clearance for " incarceration: complicated acute illness or injury  Withdrawal from methamphetamine: complicated acute illness or injury    Amount and/or Complexity of Data Reviewed  ECG/medicine tests: ordered and independent interpretation performed. Decision-making details documented in ED Course.    Risk  OTC drugs.  Prescription drug management.        Final diagnoses:   Withdrawal from methamphetamine   Medical clearance for incarceration       ED Disposition  ED Disposition       ED Disposition   Discharge    Condition   Stable    Comment   --                 Patient should be reseen by the retirement provider within the next 24 hours    Reseen by the retirement provider in 24 hours         Medication List      No changes were made to your prescriptions during this visit.            Alicja Hunter, APRN  05/08/24 2003

## 2025-06-05 ENCOUNTER — APPOINTMENT (OUTPATIENT)
Dept: GENERAL RADIOLOGY | Facility: HOSPITAL | Age: 40
End: 2025-06-05
Payer: COMMERCIAL

## 2025-06-05 ENCOUNTER — HOSPITAL ENCOUNTER (EMERGENCY)
Facility: HOSPITAL | Age: 40
Discharge: HOME | End: 2025-06-05
Payer: COMMERCIAL

## 2025-06-05 VITALS
HEIGHT: 66 IN | WEIGHT: 151.46 LBS | SYSTOLIC BLOOD PRESSURE: 105 MMHG | BODY MASS INDEX: 24.34 KG/M2 | RESPIRATION RATE: 22 BRPM | HEART RATE: 72 BPM | OXYGEN SATURATION: 100 % | DIASTOLIC BLOOD PRESSURE: 65 MMHG | TEMPERATURE: 98.3 F

## 2025-06-05 DIAGNOSIS — F19.10 SUBSTANCE ABUSE: ICD-10-CM

## 2025-06-05 DIAGNOSIS — N91.2 AMENORRHEA: ICD-10-CM

## 2025-06-05 DIAGNOSIS — R05.2 SUBACUTE COUGH: Primary | ICD-10-CM

## 2025-06-05 LAB
ALBUMIN SERPL-MCNC: 4.5 G/DL (ref 3.5–5.2)
ALBUMIN/GLOB SERPL: 1.3 G/DL
ALP SERPL-CCNC: 98 U/L (ref 39–117)
ALT SERPL W P-5'-P-CCNC: 13 U/L (ref 1–33)
AMPHET+METHAMPHET UR QL: POSITIVE
AMPHETAMINES UR QL: POSITIVE
ANION GAP SERPL CALCULATED.3IONS-SCNC: 11.5 MMOL/L (ref 5–15)
AST SERPL-CCNC: 10 U/L (ref 1–32)
B-HCG UR QL: NEGATIVE
BACTERIA UR QL AUTO: ABNORMAL /HPF
BARBITURATES UR QL SCN: NEGATIVE
BASOPHILS # BLD AUTO: 0.05 10*3/MM3 (ref 0–0.2)
BASOPHILS NFR BLD AUTO: 0.7 % (ref 0–1.5)
BENZODIAZ UR QL SCN: NEGATIVE
BILIRUB SERPL-MCNC: 0.3 MG/DL (ref 0–1.2)
BILIRUB UR QL STRIP: NEGATIVE
BUN SERPL-MCNC: 13.6 MG/DL (ref 6–20)
BUN/CREAT SERPL: 18.4 (ref 7–25)
BUPRENORPHINE SERPL-MCNC: NEGATIVE NG/ML
CALCIUM SPEC-SCNC: 9 MG/DL (ref 8.6–10.5)
CANNABINOIDS SERPL QL: POSITIVE
CHLORIDE SERPL-SCNC: 105 MMOL/L (ref 98–107)
CLARITY UR: CLEAR
CO2 SERPL-SCNC: 24.5 MMOL/L (ref 22–29)
COCAINE UR QL: NEGATIVE
COLOR UR: YELLOW
CREAT SERPL-MCNC: 0.74 MG/DL (ref 0.57–1)
DEPRECATED RDW RBC AUTO: 46.5 FL (ref 37–54)
EGFRCR SERPLBLD CKD-EPI 2021: 106.4 ML/MIN/1.73
EOSINOPHIL # BLD AUTO: 0.32 10*3/MM3 (ref 0–0.4)
EOSINOPHIL NFR BLD AUTO: 4.3 % (ref 0.3–6.2)
ERYTHROCYTE [DISTWIDTH] IN BLOOD BY AUTOMATED COUNT: 13.4 % (ref 12.3–15.4)
FLUAV RNA RESP QL NAA+PROBE: NOT DETECTED
FLUBV RNA RESP QL NAA+PROBE: NOT DETECTED
GEN 5 1HR TROPONIN T REFLEX: <6 NG/L
GLOBULIN UR ELPH-MCNC: 3.5 GM/DL
GLUCOSE SERPL-MCNC: 81 MG/DL (ref 65–99)
GLUCOSE UR STRIP-MCNC: NEGATIVE MG/DL
HCT VFR BLD AUTO: 43.5 % (ref 34–46.6)
HGB BLD-MCNC: 14.1 G/DL (ref 12–15.9)
HGB UR QL STRIP.AUTO: NEGATIVE
HOLD SPECIMEN: NORMAL
HYALINE CASTS UR QL AUTO: ABNORMAL /LPF
IMM GRANULOCYTES # BLD AUTO: 0.02 10*3/MM3 (ref 0–0.05)
IMM GRANULOCYTES NFR BLD AUTO: 0.3 % (ref 0–0.5)
KETONES UR QL STRIP: ABNORMAL
LEUKOCYTE ESTERASE UR QL STRIP.AUTO: ABNORMAL
LIPASE SERPL-CCNC: 59 U/L (ref 13–60)
LYMPHOCYTES # BLD AUTO: 3.44 10*3/MM3 (ref 0.7–3.1)
LYMPHOCYTES NFR BLD AUTO: 45.7 % (ref 19.6–45.3)
MCH RBC QN AUTO: 30.4 PG (ref 26.6–33)
MCHC RBC AUTO-ENTMCNC: 32.4 G/DL (ref 31.5–35.7)
MCV RBC AUTO: 93.8 FL (ref 79–97)
METHADONE UR QL SCN: NEGATIVE
MONOCYTES # BLD AUTO: 0.5 10*3/MM3 (ref 0.1–0.9)
MONOCYTES NFR BLD AUTO: 6.6 % (ref 5–12)
NEUTROPHILS NFR BLD AUTO: 3.19 10*3/MM3 (ref 1.7–7)
NEUTROPHILS NFR BLD AUTO: 42.4 % (ref 42.7–76)
NITRITE UR QL STRIP: NEGATIVE
NRBC BLD AUTO-RTO: 0 /100 WBC (ref 0–0.2)
OPIATES UR QL: NEGATIVE
OXYCODONE UR QL SCN: NEGATIVE
PCP UR QL SCN: NEGATIVE
PH UR STRIP.AUTO: 5.5 [PH] (ref 5–8)
PLATELET # BLD AUTO: 245 10*3/MM3 (ref 140–450)
PMV BLD AUTO: 9.8 FL (ref 6–12)
POTASSIUM SERPL-SCNC: 3.8 MMOL/L (ref 3.5–5.2)
PROT SERPL-MCNC: 8 G/DL (ref 6–8.5)
PROT UR QL STRIP: NEGATIVE
RBC # BLD AUTO: 4.64 10*6/MM3 (ref 3.77–5.28)
RBC # UR STRIP: ABNORMAL /HPF
REF LAB TEST METHOD: ABNORMAL
RSV RNA RESP QL NAA+PROBE: NOT DETECTED
SARS-COV-2 RNA RESP QL NAA+PROBE: NOT DETECTED
SODIUM SERPL-SCNC: 141 MMOL/L (ref 136–145)
SP GR UR STRIP: 1.03 (ref 1–1.03)
SQUAMOUS #/AREA URNS HPF: ABNORMAL /HPF
TRICYCLICS UR QL SCN: NEGATIVE
TROPONIN T NUMERIC DELTA: NORMAL
TROPONIN T SERPL HS-MCNC: 7 NG/L
UROBILINOGEN UR QL STRIP: ABNORMAL
WBC # UR STRIP: ABNORMAL /HPF
WBC NRBC COR # BLD AUTO: 7.52 10*3/MM3 (ref 3.4–10.8)
WHOLE BLOOD HOLD COAG: NORMAL

## 2025-06-05 PROCEDURE — 80053 COMPREHEN METABOLIC PANEL: CPT | Performed by: OCCUPATIONAL THERAPIST

## 2025-06-05 PROCEDURE — 87637 SARSCOV2&INF A&B&RSV AMP PRB: CPT | Performed by: OCCUPATIONAL THERAPIST

## 2025-06-05 PROCEDURE — 36415 COLL VENOUS BLD VENIPUNCTURE: CPT

## 2025-06-05 PROCEDURE — 84484 ASSAY OF TROPONIN QUANT: CPT | Performed by: OCCUPATIONAL THERAPIST

## 2025-06-05 PROCEDURE — 85025 COMPLETE CBC W/AUTO DIFF WBC: CPT | Performed by: OCCUPATIONAL THERAPIST

## 2025-06-05 PROCEDURE — 71046 X-RAY EXAM CHEST 2 VIEWS: CPT

## 2025-06-05 PROCEDURE — 99283 EMERGENCY DEPT VISIT LOW MDM: CPT

## 2025-06-05 PROCEDURE — 81001 URINALYSIS AUTO W/SCOPE: CPT | Performed by: OCCUPATIONAL THERAPIST

## 2025-06-05 PROCEDURE — 81025 URINE PREGNANCY TEST: CPT | Performed by: OCCUPATIONAL THERAPIST

## 2025-06-05 PROCEDURE — 83690 ASSAY OF LIPASE: CPT | Performed by: OCCUPATIONAL THERAPIST

## 2025-06-05 PROCEDURE — 80306 DRUG TEST PRSMV INSTRMNT: CPT | Performed by: OCCUPATIONAL THERAPIST

## 2025-06-05 RX ORDER — BENZONATATE 100 MG/1
100 CAPSULE ORAL 3 TIMES DAILY PRN
Qty: 15 CAPSULE | Refills: 0 | Status: SHIPPED | OUTPATIENT
Start: 2025-06-05

## 2025-06-05 RX ORDER — BENZONATATE 100 MG/1
100 CAPSULE ORAL ONCE
Status: COMPLETED | OUTPATIENT
Start: 2025-06-05 | End: 2025-06-05

## 2025-06-05 RX ADMIN — BENZONATATE 100 MG: 100 CAPSULE ORAL at 20:38

## 2025-06-05 NOTE — Clinical Note
[facility] EMERGENCY DEPARTMENT  [address] [place] [address]  Phone: [phone]    Lay Restrepo was seen and treated in our emergency department on 6/5/2025.  She may return to work on 06/06/2025.         Thank you for choosing [facility].    Romy Bone PA-C      
Ataxic gait/Stroke/TBI (neurological/cognitive impairment)/Fall risk

## 2025-06-05 NOTE — ED PROVIDER NOTES
Subjective   History of Present Illness  Patient is a 39-year-old female who denies any pertinent past medical history presenting to the ED for nonproductive cough x 3 weeks.  Patient reports that she has been coughing so much that she is having pain in her right ribs and feels like that she has fluid in her lungs.  Patient reports she has also had some headache and nausea.  She denies any rhinorrhea or sinus pressure.  No fever, body aches, or chills.      Patient reports that she has not had a period in 6 months.  She is sexually active.  She denies any dysuria, vaginal discharge, pelvic pain, hematuria, and abdominal cramping.        Review of Systems   Constitutional:  Negative for chills and fever.   Respiratory:  Positive for cough. Negative for shortness of breath and wheezing.        No past medical history on file.    Allergies   Allergen Reactions    Amoxicillin Unknown - High Severity    Penicillins Unknown - High Severity    Sulfa Antibiotics Unknown - High Severity       Past Surgical History:   Procedure Laterality Date    INCISION AND DRAINAGE ARM Left 11/4/2023    Procedure: INCISION AND DRAINAGE UPPER EXTREMITY;  Surgeon: Javed Lim MD;  Location: [facility];  Service: General;  Laterality: Left;       No family history on file.    Social History     Socioeconomic History    Marital status:    Tobacco Use    Smoking status: Every Day     Current packs/day: 0.50     Average packs/day: 0.5 packs/day for 7.0 years (3.5 ttl pk-yrs)     Types: Cigarettes   Vaping Use    Vaping status: Every Day   Substance and Sexual Activity    Drug use: Yes     Types: Amphetamines, Fentanyl, Methylphenidate, Methamphetamines    Sexual activity: Yes           Objective   Physical Exam  Vitals and nursing note reviewed.   Constitutional:       General: She is awake. She is not in acute distress.     Appearance: She is normal weight.      Comments: Chronically ill-appearing   HENT:      Head:  Normocephalic and atraumatic.      Jaw: No trismus or tenderness.      Right Ear: External ear normal. No tenderness.      Left Ear: External ear normal. No tenderness.      Nose: Nose normal. No nasal deformity, septal deviation, laceration, congestion or rhinorrhea.      Right Nostril: No septal hematoma.      Left Nostril: No septal hematoma.      Right Turbinates: Not enlarged, swollen or pale.      Left Turbinates: Not enlarged, swollen or pale.      Right Sinus: No maxillary sinus tenderness or frontal sinus tenderness.      Left Sinus: No maxillary sinus tenderness or frontal sinus tenderness.      Mouth/Throat:      Mouth: Mucous membranes are moist.      Pharynx: Oropharynx is clear.   Eyes:      General: No scleral icterus.        Right eye: No discharge.         Left eye: No discharge.      Extraocular Movements: Extraocular movements intact.      Conjunctiva/sclera: Conjunctivae normal.      Pupils: Pupils are equal, round, and reactive to light.   Neck:      Vascular: No carotid bruit.   Cardiovascular:      Rate and Rhythm: Normal rate and regular rhythm.      Pulses: Normal pulses.      Heart sounds: Normal heart sounds. No murmur heard.     No friction rub. No gallop.   Pulmonary:      Effort: Pulmonary effort is normal. No respiratory distress.      Breath sounds: Normal breath sounds. No stridor. No wheezing, rhonchi or rales.   Chest:      Chest wall: No tenderness.   Abdominal:      General: Abdomen is flat. Bowel sounds are normal. There is no distension.      Palpations: Abdomen is soft.      Tenderness: There is no abdominal tenderness. There is no right CVA tenderness, left CVA tenderness or guarding.   Musculoskeletal:         General: Normal range of motion.      Cervical back: Normal range of motion and neck supple. No rigidity or tenderness.      Right lower leg: No edema.      Left lower leg: No edema.   Lymphadenopathy:      Cervical: No cervical adenopathy.   Skin:     General: Skin is  warm and dry.      Capillary Refill: Capillary refill takes 2 to 3 seconds.      Coloration: Skin is not jaundiced.      Findings: No rash.   Neurological:      General: No focal deficit present.      Mental Status: She is alert.      Sensory: No sensory deficit.      Deep Tendon Reflexes: Reflexes normal.   Psychiatric:         Behavior: Behavior is hyperactive. Behavior is cooperative.         Procedures           ED Course  ED Course as of 06/06/25 0108   Thu Jun 05, 2025 2030 Patient offered TVUS but declined [ME]      ED Course User Index  [ME] Romy Bone PA-C      Labs Reviewed   URINALYSIS W/ CULTURE IF INDICATED - Abnormal; Notable for the following components:       Result Value    Ketones, UA Trace (*)     Leuk Esterase, UA Small (1+) (*)     All other components within normal limits    Narrative:     In absence of clinical symptoms, the presence of pyuria, bacteria, and/or nitrites on the urinalysis result does not correlate with infection.   URINE DRUG SCREEN - Abnormal; Notable for the following components:    THC, Screen, Urine Positive (*)     Methamphetamine, Ur Positive (*)     Amphetamine Screen, Urine Positive (*)     All other components within normal limits    Narrative:     Cutoff For Drugs Screened:    Amphetamines               500 ng/ml  Barbiturates               200 ng/ml  Benzodiazepines            150 ng/ml  Cocaine                    150 ng/ml  Methadone                  200 ng/ml  Opiates                    100 ng/ml  Phencyclidine               25 ng/ml  THC                         50 ng/ml  Methamphetamine            500 ng/ml  Tricyclic Antidepressants  300 ng/ml  Oxycodone                  100 ng/ml  Buprenorphine               10 ng/ml    The normal value for all drugs tested is negative. This report includes unconfirmed screening results, with the cutoff values listed, to be used for medical treatment purposes only.  Unconfirmed results must not be used for non-medical  purposes such as employment or legal testing.  Clinical consideration should be applied to any drug of abuse test, particularly when unconfirmed results are used.    All urine drugs of abuse requests without chain of custody are for medical screening purposes only.  False positives are possible.     CBC WITH AUTO DIFFERENTIAL - Abnormal; Notable for the following components:    Neutrophil % 42.4 (*)     Lymphocyte % 45.7 (*)     Lymphocytes, Absolute 3.44 (*)     All other components within normal limits   URINALYSIS, MICROSCOPIC ONLY - Abnormal; Notable for the following components:    WBC, UA 3-5 (*)     All other components within normal limits   COVID-19/FLUA&B/RSV, NP SWAB IN TRANSPORT MEDIA 1 HR TAT - Normal    Narrative:     Fact sheet for providers: https://www.fda.gov/media/730792/download    Fact sheet for patients: https://www.fda.gov/media/260117/download    Test performed by PCR.   PREGNANCY, URINE - Normal   LIPASE - Normal   TROPONIN - Normal    Narrative:     High Sensitive Troponin T Reference Range:  <14.0 ng/L- Negative Female for AMI  <22.0 ng/L- Negative Male for AMI  >=14 - Abnormal Female indicating possible myocardial injury.  >=22 - Abnormal Male indicating possible myocardial injury.   Clinicians would have to utilize clinical acumen, EKG, Troponin, and serial changes to determine if it is an Acute Myocardial Infarction or myocardial injury due to an underlying chronic condition.        COMPREHENSIVE METABOLIC PANEL    Narrative:     GFR Categories in Chronic Kidney Disease (CKD)              GFR Category          GFR (mL/min/1.73)    Interpretation  G1                    90 or greater        Normal or high (1)  G2                    60-89                Mild decrease (1)  G3a                   45-59                Mild to moderate decrease  G3b                   30-44                Moderate to severe decrease  G4                    15-29                Severe decrease  G5                     14 or less           Kidney failure    (1)In the absence of evidence of kidney disease, neither GFR category G1 or G2 fulfill the criteria for CKD.    eGFR calculation 2021 CKD-EPI creatinine equation, which does not include race as a factor   HIGH SENSITIVITIY TROPONIN T 1HR    Narrative:     High Sensitive Troponin T Reference Range:  <14.0 ng/L- Negative Female for AMI  <22.0 ng/L- Negative Male for AMI  >=14 - Abnormal Female indicating possible myocardial injury.  >=22 - Abnormal Male indicating possible myocardial injury.   Clinicians would have to utilize clinical acumen, EKG, Troponin, and serial changes to determine if it is an Acute Myocardial Infarction or myocardial injury due to an underlying chronic condition.        CBC AND DIFFERENTIAL    Narrative:     The following orders were created for panel order CBC & Differential.  Procedure                               Abnormality         Status                     ---------                               -----------         ------                     CBC Auto Differential[[id number]]        Abnormal            Final result                 Please view results for these tests on the individual orders.   EXTRA TUBES    Narrative:     The following orders were created for panel order Extra Tubes.  Procedure                               Abnormality         Status                     ---------                               -----------         ------                     Gold Top - SST[[id number]]                                   Final result               Light Blue Top[[id number]]                                   Final result                 Please view results for these tests on the individual orders.   GOLD TOP - SST   LIGHT BLUE TOP     XR Chest 2 View  Result Date: 6/5/2025  Impression: No acute cardiopulmonary process. Electronically Signed: Fahad Durbin MD  6/5/2025 7:41 PM EDT  Workstation ID: [id number]    Medications   benzonatate (TESSALON) capsule  100 mg (100 mg Oral Given 6/5/25 2038)                                                        Medical Decision Making  Patient is a 49-year-old female presenting with the above complaint.  She had the above evaluation and exam.  Patient was placed on monitor and IV was established.    Imaging independently interpreted by radiology and reviewed by myself.  X-ray of the chest was negative for acute finding.    Patient tested positive for THC, methamphetamine, and amphetamines.  Urinalysis showed 3-5 white cells, no RBCs, no bacteria.  Patient is not having any urinary symptoms.  UPT was negative.  Lipase and CMP were normal.  Troponins were negative.  Respiratory swab negative for COVID, influenza A and B, and RSV.  She was offered pelvic ultrasound and exam but declined.    Patient was given Tessalon in the ED with symptomatic improvement.  At reassessment, she is resting comfortably no acute distress.  She is afebrile and hemodynamically stable.  Patient has been 98 to 100% SpO2 on room air since arrival.  Findings were discussed with patient, and return precautions were given.  Patient was instructed to follow-up with OB/GYN regarding her amenorrhea.  Patient is not having any pelvic pain, vaginal discharge, or other related symptoms at this time.  Patient was discharged home with prescription for Tessalon Perles.  She verbalized agreement understanding.    Amount and/or Complexity of Data Reviewed  Labs: ordered.  Radiology: ordered.    Risk  Prescription drug management.        Final diagnoses:   Subacute cough   Amenorrhea   Substance abuse       ED Disposition  ED Disposition       ED Disposition   Discharge    Condition   Stable    Comment   --               PATIENT CONNECTION - [facility] [address]  [phone]  Call   to establish primary care         Medication List        New Prescriptions      benzonatate 100 MG capsule  Commonly known as: Tessalon Perles  Take 1 capsule by mouth 3 (Three)  Times a Day As Needed for Cough.               Where to Get Your Medications        These medications were sent to [facility] [address] [place] [address]      Hours: Monday to Friday 7 AM to 7 PM Phone: [phone]   benzonatate 100 MG capsule            Romy Bone PA-C  06/06/25 0107       Romy Bone PA-C  06/06/25 0108

## 2025-06-06 NOTE — DISCHARGE INSTRUCTIONS
Call the number above to establish primary care if you do not have a PCP.    You will receive a phone call to schedule an appointment with an OB/GYN to discuss why you are not having periods    Take medication as prescribed.    Return to the ER with new or worsening symptoms.

## (undated) DEVICE — GLV SURG PREMIERPRO ORTHO LTX PF SZ7.5 BRN

## (undated) DEVICE — TRAP FLD MINIVAC MEGADYNE 100ML

## (undated) DEVICE — APPL CHLORAPREP HI/LITE 26ML ORNG

## (undated) DEVICE — PATIENT RETURN ELECTRODE, SINGLE-USE, CONTACT QUALITY MONITORING, ADULT, WITH 9FT CORD, FOR PATIENTS WEIGING OVER 33LBS. (15KG): Brand: MEGADYNE

## (undated) DEVICE — LOU MINOR PROCEDURE: Brand: MEDLINE INDUSTRIES, INC.